# Patient Record
Sex: MALE | Race: BLACK OR AFRICAN AMERICAN | NOT HISPANIC OR LATINO | Employment: FULL TIME | URBAN - METROPOLITAN AREA
[De-identification: names, ages, dates, MRNs, and addresses within clinical notes are randomized per-mention and may not be internally consistent; named-entity substitution may affect disease eponyms.]

---

## 2017-07-10 ENCOUNTER — HOSPITAL ENCOUNTER (EMERGENCY)
Facility: HOSPITAL | Age: 37
Discharge: HOME | End: 2017-07-10
Attending: EMERGENCY MEDICINE
Payer: MEDICAID

## 2017-07-10 VITALS
DIASTOLIC BLOOD PRESSURE: 100 MMHG | HEIGHT: 71 IN | WEIGHT: 195 LBS | TEMPERATURE: 98 F | SYSTOLIC BLOOD PRESSURE: 164 MMHG | RESPIRATION RATE: 16 BRPM | HEART RATE: 80 BPM | BODY MASS INDEX: 27.3 KG/M2 | OXYGEN SATURATION: 99 %

## 2017-07-10 DIAGNOSIS — M54.9 ACUTE BILATERAL BACK PAIN, UNSPECIFIED BACK LOCATION: Primary | ICD-10-CM

## 2017-07-10 PROCEDURE — 99283 EMERGENCY DEPT VISIT LOW MDM: CPT

## 2017-07-10 RX ORDER — KETOROLAC TROMETHAMINE 30 MG/ML
30 INJECTION, SOLUTION INTRAMUSCULAR; INTRAVENOUS
Status: DISCONTINUED | OUTPATIENT
Start: 2017-07-10 | End: 2017-07-10 | Stop reason: HOSPADM

## 2017-07-10 RX ORDER — NAPROXEN 500 MG/1
500 TABLET ORAL 2 TIMES DAILY WITH MEALS
Qty: 14 TABLET | Refills: 0 | Status: SHIPPED | OUTPATIENT
Start: 2017-07-10 | End: 2017-09-25 | Stop reason: SDUPTHER

## 2017-07-10 RX ORDER — ORPHENADRINE CITRATE 30 MG/ML
30 INJECTION INTRAMUSCULAR; INTRAVENOUS
Status: DISCONTINUED | OUTPATIENT
Start: 2017-07-10 | End: 2017-07-10 | Stop reason: HOSPADM

## 2017-07-10 RX ORDER — METHOCARBAMOL 750 MG/1
1500 TABLET, FILM COATED ORAL 3 TIMES DAILY
Qty: 30 TABLET | Refills: 0 | Status: SHIPPED | OUTPATIENT
Start: 2017-07-10 | End: 2017-07-15

## 2017-07-10 NOTE — ED NOTES
"Pt presents to ED secondary to left sided middle back pain. States helped aunt move furniture yesterday. States had pain relief from ibuprofen yesterday but did not take any today. FROM. Pt states back is "stiff." States does remember while picking up steve drawers and feeling a "pulling" sensation. NAD noted.   "

## 2017-07-10 NOTE — ED PROVIDER NOTES
"Encounter Date: 7/10/2017       History     Chief Complaint   Patient presents with    Back Pain     States back tightened up on him yesterday, helped aunt move on Saturday.       José Luis Catherine, a 37 y.o. male that presents to the ED for back pain that started after helping a relative move furniture on Saturday.  He states he felt pain to his lower back after he tried to move a dresser.  Denies any trauma.  The pain is aching in nature with no radiation, worse with certain movement and better with rest.  Denies any history of back pain, injury, numbness or tingling down either leg or loss of bowel or bladder.  Treatments tried including taking an with significant improvement of his back pain yesterday.  No medications taken today.  Patient states "I just want to see if I have pulled muscle".        The history is provided by the patient.     Review of patient's allergies indicates:  No Known Allergies  History reviewed. No pertinent past medical history.  History reviewed. No pertinent surgical history.  History reviewed. No pertinent family history.  Social History   Substance Use Topics    Smoking status: Former Smoker     Packs/day: 1.00     Types: Cigarettes    Smokeless tobacco: Never Used    Alcohol use No     Review of Systems   Constitutional: Negative for fever.   Respiratory: Negative for cough and shortness of breath.    Cardiovascular: Negative for chest pain and leg swelling.   Gastrointestinal: Negative for nausea.   Genitourinary: Negative for dysuria.   Musculoskeletal: Positive for back pain.   Skin: Negative for color change and wound.   Allergic/Immunologic: Negative for immunocompromised state.   Hematological: Negative for adenopathy.   Psychiatric/Behavioral: Negative for agitation.   All other systems reviewed and are negative.      Physical Exam     Initial Vitals [07/10/17 0813]   BP Pulse Resp Temp SpO2   (!) 164/100 80 16 97.6 °F (36.4 °C) 99 %      MAP       121.33         Physical " Exam    Nursing note and vitals reviewed.  Constitutional: He appears well-developed and well-nourished.   HENT:   Head: Normocephalic and atraumatic.   Right Ear: External ear normal.   Left Ear: External ear normal.   Nose: Nose normal.   Mouth/Throat: Oropharynx is clear and moist.   Eyes: EOM are normal.   Neck: Normal range of motion. Neck supple.   Cardiovascular: Normal rate and regular rhythm.   Pulmonary/Chest: Breath sounds normal. No respiratory distress. He has no wheezes. He has no rhonchi. He has no rales.   Abdominal: Soft. He exhibits no distension.   Musculoskeletal: Normal range of motion. He exhibits no edema or tenderness.        Cervical back: Normal.        Thoracic back: Normal.        Lumbar back: Normal.   Noted to be over spinous processes of the spine.  No bony step-off.  Mildly increased pain with flexion, no increased pain with extension and lateral bending spine.  No evidence of ecchymosis or erythema.   Neurological: He is alert and oriented to person, place, and time. No cranial nerve deficit.   Skin: Skin is warm and dry. No rash and no abscess noted. No erythema.   Psychiatric: He has a normal mood and affect. Thought content normal.         ED Course   Procedures  Labs Reviewed - No data to display          Medical Decision Making:   Initial Assessment:   Back pain   Differential Diagnosis:   Sciatica, lower back pain, muscular spasm   ED Management:  Present in NAD, afebrile, and nontoxic.  He has normal AROM of the spine with mildly increased pain with flexion.  Symptoms are most consistent with low back pain due to muscular strain.  Patient was offered Toradol and Norflex refused, stating that he would just go home and take ibuprofen.  He was given information on symptom control and instructed to follow-up with his PCP for further evaluation. Strict return precautions given and patient verbalized understanding.    RX: naprosyn, robaxin (given with driving precautions.    Other:    I discussed test(s) with the performing physician.                   ED Course     Clinical Impression:   The encounter diagnosis was Acute bilateral back pain, unspecified back location.                           Nalini Feliz PA-C  07/10/17 0904       Nalini Feliz PA-C  07/10/17 0906

## 2017-09-25 ENCOUNTER — HOSPITAL ENCOUNTER (EMERGENCY)
Facility: HOSPITAL | Age: 37
Discharge: HOME | End: 2017-09-25
Attending: EMERGENCY MEDICINE
Payer: MEDICAID

## 2017-09-25 VITALS
HEART RATE: 84 BPM | DIASTOLIC BLOOD PRESSURE: 93 MMHG | HEIGHT: 71 IN | BODY MASS INDEX: 34.3 KG/M2 | OXYGEN SATURATION: 100 % | WEIGHT: 245 LBS | SYSTOLIC BLOOD PRESSURE: 148 MMHG | RESPIRATION RATE: 15 BRPM | TEMPERATURE: 98 F

## 2017-09-25 DIAGNOSIS — M54.9 ACUTE RIGHT-SIDED BACK PAIN, UNSPECIFIED BACK LOCATION: Primary | ICD-10-CM

## 2017-09-25 PROCEDURE — 25000003 PHARM REV CODE 250: Performed by: PHYSICIAN ASSISTANT

## 2017-09-25 PROCEDURE — 99283 EMERGENCY DEPT VISIT LOW MDM: CPT

## 2017-09-25 RX ORDER — NAPROXEN 500 MG/1
500 TABLET ORAL 2 TIMES DAILY WITH MEALS
Qty: 14 TABLET | Refills: 0 | Status: SHIPPED | OUTPATIENT
Start: 2017-09-25 | End: 2017-09-25

## 2017-09-25 RX ORDER — IBUPROFEN 600 MG/1
600 TABLET ORAL
Status: COMPLETED | OUTPATIENT
Start: 2017-09-25 | End: 2017-09-25

## 2017-09-25 RX ORDER — NAPROXEN 500 MG/1
500 TABLET ORAL 2 TIMES DAILY WITH MEALS
Qty: 14 TABLET | Refills: 0 | Status: SHIPPED | OUTPATIENT
Start: 2017-09-25 | End: 2017-10-13

## 2017-09-25 RX ADMIN — IBUPROFEN 600 MG: 600 TABLET, FILM COATED ORAL at 10:09

## 2017-09-25 NOTE — ED PROVIDER NOTES
"Encounter Date: 9/25/2017       History     Chief Complaint   Patient presents with    Back Pain     lower back pain since yesterday after lifting weights.  No dysuria.     José Luis Catherine, a 37 y.o. male that presents to the ED for right sided back pain that started this morning.  Patient states that he was lifting weights yesterday when the pain and thinks that he needs to rest and ice the area.  He wants to "just get checked out" because he is afraid that something is wrong with his disk.  He took 2 aleve yesterday with little improvement of his pain.  No treatments tried today.  Denies any loss of bowel or bladder or recent trauma or fall.          The history is provided by the patient.     Review of patient's allergies indicates:  No Known Allergies  History reviewed. No pertinent past medical history.  History reviewed. No pertinent surgical history.  History reviewed. No pertinent family history.  Social History   Substance Use Topics    Smoking status: Current Every Day Smoker     Packs/day: 1.00     Types: Cigarettes    Smokeless tobacco: Never Used    Alcohol use No     Review of Systems   Constitutional: Negative for chills and fever.   Respiratory: Negative for chest tightness and shortness of breath.    Cardiovascular: Negative for chest pain.   Gastrointestinal: Negative for nausea and vomiting.   Genitourinary: Negative for difficulty urinating.   Musculoskeletal: Positive for back pain. Negative for neck pain.   Skin: Negative for color change, pallor and rash.   Allergic/Immunologic: Negative for immunocompromised state.   Neurological: Negative for dizziness, weakness and numbness.   Hematological: Negative for adenopathy.   Psychiatric/Behavioral: Negative for agitation.   All other systems reviewed and are negative.      Physical Exam     Initial Vitals [09/25/17 0906]   BP Pulse Resp Temp SpO2   (!) 148/93 84 15 98.4 °F (36.9 °C) 100 %      MAP       111.33         Physical Exam    Nursing " "note and vitals reviewed.  Constitutional: He appears well-developed and well-nourished.   HENT:   Head: Normocephalic and atraumatic.   Right Ear: External ear normal.   Left Ear: External ear normal.   Nose: Nose normal.   Mouth/Throat: Oropharynx is clear and moist.   Eyes: EOM are normal.   Neck: Normal range of motion. Neck supple.   Cardiovascular: Normal rate and regular rhythm.   Pulmonary/Chest: Breath sounds normal.   Abdominal: Soft. Bowel sounds are normal. He exhibits no distension.   Musculoskeletal: Normal range of motion. He exhibits no edema or tenderness.        Cervical back: Normal.        Thoracic back: Normal.        Lumbar back: Normal.   Noted to be over spinous processes of the spine.  No bony step-off.  Mildly increased pain with flexion, no increased pain with extension and lateral bending spine.  No evidence of ecchymosis or erythema.    Lymphadenopathy:     He has no cervical adenopathy.   Neurological: He is alert and oriented to person, place, and time. He has normal strength. No cranial nerve deficit or sensory deficit.   Skin: Skin is warm and dry. No rash and no abscess noted. No erythema.   Psychiatric: He has a normal mood and affect. Thought content normal.         ED Course   Procedures  Labs Reviewed - No data to display          Medical Decision Making:   Initial Assessment:   Right sided low back pain   Differential Diagnosis:   Muscular strain, sciatica, herniated disk   ED Management:  Present in NAD, afebrile, and nontoxic.  He has normal AROM of the spine with mildly increased pain with flexion.  Symptoms are most consistent with low back pain due to muscular strain.  Patient was offered Toradol and Norflex refused, stating that he would just go home and take ibuprofen.  He was given information on symptom control and instructed to follow-up with his PCP for further evaluation. Of note, patient states that he "does not feel like this is an emergency" and was seen here for " the same complaint 2 months prior and specifically requested at work note that time.  He did not follow up with PCP for further evaluation. He was given education on proper utilization of ED.  Strict return precautions given and patient verbalized understanding.    RX: naprosyn                Attending Attestation:     Physician Attestation Statement for NP/PA:   I discussed this assessment and plan of this patient with the NP/PA, but I did not personally examine the patient. The face to face encounter was performed by the NP/PA.                  ED Course      Clinical Impression:   The encounter diagnosis was Acute right-sided back pain, unspecified back location.    Disposition:   Disposition: Discharged  Condition: Stable                        Nalini Feliz PA-C  09/25/17 1038       Zora Isbael MD  09/25/17 2637

## 2017-10-13 ENCOUNTER — HOSPITAL ENCOUNTER (EMERGENCY)
Facility: HOSPITAL | Age: 37
Discharge: HOME | End: 2017-10-13
Attending: EMERGENCY MEDICINE
Payer: MEDICAID

## 2017-10-13 VITALS
DIASTOLIC BLOOD PRESSURE: 88 MMHG | HEART RATE: 78 BPM | BODY MASS INDEX: 28.84 KG/M2 | HEIGHT: 71 IN | WEIGHT: 206 LBS | SYSTOLIC BLOOD PRESSURE: 135 MMHG | OXYGEN SATURATION: 100 % | RESPIRATION RATE: 18 BRPM | TEMPERATURE: 97 F

## 2017-10-13 DIAGNOSIS — H10.31 ACUTE CONJUNCTIVITIS OF RIGHT EYE, UNSPECIFIED ACUTE CONJUNCTIVITIS TYPE: Primary | ICD-10-CM

## 2017-10-13 PROCEDURE — 99283 EMERGENCY DEPT VISIT LOW MDM: CPT

## 2017-10-13 PROCEDURE — 25000003 PHARM REV CODE 250: Performed by: EMERGENCY MEDICINE

## 2017-10-13 RX ORDER — PROPARACAINE HYDROCHLORIDE 5 MG/ML
1 SOLUTION/ DROPS OPHTHALMIC
Status: COMPLETED | OUTPATIENT
Start: 2017-10-13 | End: 2017-10-13

## 2017-10-13 RX ORDER — ERYTHROMYCIN 5 MG/G
OINTMENT OPHTHALMIC
Qty: 1 TUBE | Refills: 0 | Status: SHIPPED | OUTPATIENT
Start: 2017-10-13 | End: 2018-03-05

## 2017-10-13 RX ADMIN — PROPARACAINE HYDROCHLORIDE 1 DROP: 5 SOLUTION/ DROPS OPHTHALMIC at 11:10

## 2017-10-13 RX ADMIN — FLUORESCEIN SODIUM 1 STRIP: 1 STRIP OPHTHALMIC at 11:10

## 2017-10-13 NOTE — DISCHARGE INSTRUCTIONS
1) PLEASE FOLLOW UP WITH YOUR PRIMARY CARE PROVIDER IN 3 DAYS IF YOU ARE NOT SIGNIFICANTLY IMPROVED  2) PLEASE TAKE YOUR MEDICATIONS AS PRESCRIBED- ERYTHROMYCIN OINTMENT  3) RETURN TO THE ED FOR WORSENING SYMPTOMS

## 2017-10-13 NOTE — ED PROVIDER NOTES
Encounter Date: 10/13/2017       History     Chief Complaint   Patient presents with    Eye Drainage     pt c/o green drainage from left eye x 1 day     37-year-old male with thick green discharge from his left eye that started yesterday.  He works in construction, is unsure whether or not something got caught in his eye.  He denies fevers chills nausea or vomiting.  He's having thick discharge, denies any sick contacts.  He is having blurred vision from all of the discharge.  Denies fevers or chills.          Review of patient's allergies indicates:  No Known Allergies  History reviewed. No pertinent past medical history.  History reviewed. No pertinent surgical history.  History reviewed. No pertinent family history.  Social History   Substance Use Topics    Smoking status: Current Every Day Smoker     Packs/day: 1.00     Types: Cigarettes    Smokeless tobacco: Never Used    Alcohol use No     Review of Systems   Eyes: Positive for discharge.   All other systems reviewed and are negative.      Physical Exam     Initial Vitals [10/13/17 0945]   BP Pulse Resp Temp SpO2   (!) 139/93 80 18 98.5 °F (36.9 °C) 99 %      MAP       108.33         Physical Exam    Nursing note and vitals reviewed.  Constitutional: He appears well-developed and well-nourished.   HENT:   Head: Normocephalic and atraumatic.   Right Ear: External ear normal.   Left Ear: External ear normal.   Eyes: EOM are normal. Pupils are equal, round, and reactive to light.   He has left-sided conjunctival injection, and thick greenish yellow discharge.  Slit-lamp exam without any fluorescein uptake.  He has no evidence of corneal abrasion or foreign body.    His visual acuity is 20/25 in both eyes.   Neck: Normal range of motion.   Cardiovascular: Normal rate.   Pulmonary/Chest: Breath sounds normal.   Abdominal: Soft. Bowel sounds are normal.   Musculoskeletal: Normal range of motion.   Neurological: He is alert and oriented to person, place, and  time.   Skin: Skin is warm and dry.   Psychiatric: He has a normal mood and affect. His behavior is normal. Thought content normal.         ED Course   Procedures  Labs Reviewed - No data to display                            ED Course      Clinical Impression:   The encounter diagnosis was Acute conjunctivitis of right eye, unspecified acute conjunctivitis type.                           Allison Goldman MD  10/13/17 9362

## 2018-03-05 ENCOUNTER — HOSPITAL ENCOUNTER (EMERGENCY)
Facility: HOSPITAL | Age: 38
Discharge: HOME | End: 2018-03-05
Attending: EMERGENCY MEDICINE
Payer: MEDICAID

## 2018-03-05 VITALS
HEART RATE: 104 BPM | HEIGHT: 71 IN | SYSTOLIC BLOOD PRESSURE: 134 MMHG | DIASTOLIC BLOOD PRESSURE: 86 MMHG | BODY MASS INDEX: 31.5 KG/M2 | TEMPERATURE: 99 F | RESPIRATION RATE: 16 BRPM | WEIGHT: 225 LBS | OXYGEN SATURATION: 96 %

## 2018-03-05 DIAGNOSIS — R68.89 FLU-LIKE SYMPTOMS: Primary | ICD-10-CM

## 2018-03-05 PROCEDURE — 25000003 PHARM REV CODE 250: Performed by: EMERGENCY MEDICINE

## 2018-03-05 PROCEDURE — 99283 EMERGENCY DEPT VISIT LOW MDM: CPT

## 2018-03-05 RX ORDER — IBUPROFEN 600 MG/1
600 TABLET ORAL
Status: COMPLETED | OUTPATIENT
Start: 2018-03-05 | End: 2018-03-05

## 2018-03-05 RX ORDER — IBUPROFEN 600 MG/1
600 TABLET ORAL EVERY 6 HOURS PRN
Qty: 20 TABLET | Refills: 0 | Status: SHIPPED | OUTPATIENT
Start: 2018-03-05 | End: 2018-04-02 | Stop reason: ALTCHOICE

## 2018-03-05 RX ORDER — FLUTICASONE PROPIONATE 50 MCG
1 SPRAY, SUSPENSION (ML) NASAL 2 TIMES DAILY PRN
Qty: 15 G | Refills: 0 | Status: SHIPPED | OUTPATIENT
Start: 2018-03-05 | End: 2018-06-25

## 2018-03-05 RX ORDER — CETIRIZINE HYDROCHLORIDE 10 MG/1
10 TABLET ORAL DAILY
Qty: 30 TABLET | Refills: 0 | Status: SHIPPED | OUTPATIENT
Start: 2018-03-05 | End: 2018-06-25

## 2018-03-05 RX ORDER — BENZONATATE 100 MG/1
100 CAPSULE ORAL 3 TIMES DAILY PRN
Qty: 20 CAPSULE | Refills: 0 | Status: SHIPPED | OUTPATIENT
Start: 2018-03-05 | End: 2018-03-15

## 2018-03-05 RX ADMIN — IBUPROFEN 600 MG: 600 TABLET, FILM COATED ORAL at 08:03

## 2018-03-05 NOTE — ED NOTES
Pt c/o sinus congestion, headache, and cough with clear sputum since Friday. Also c/o fever today. Has been taking ibuprofen and robitussin at home. Breath sounds clear throughout chest. Skin is hot, dry.

## 2018-03-05 NOTE — ED PROVIDER NOTES
Encounter Date: 3/5/2018       History     Chief Complaint   Patient presents with    Cough     cough, congestion, headache since yesterday.  Family member has the flu      José Luis Catherine, a 37 y.o. male that presents to the ED for fever, congestion and nonproductive cough since Friday.  Patient states that his daughter was recently diagnosed with the flu.  Treatments tried included administration of ibuprofen and Robitussin with little improvement of his symptoms.  Patient does attest to smoking history but states that his cough has been dry in nature.  He denies any sore throat, nausea, vomiting, chest pain, shortness of breath.        The history is provided by the patient.     Review of patient's allergies indicates:  No Known Allergies  History reviewed. No pertinent past medical history.  History reviewed. No pertinent surgical history.  History reviewed. No pertinent family history.  Social History   Substance Use Topics    Smoking status: Current Every Day Smoker     Packs/day: 1.00     Types: Cigarettes    Smokeless tobacco: Never Used    Alcohol use No     Review of Systems   Constitutional: Positive for fever.   Respiratory: Positive for cough. Negative for shortness of breath.    Cardiovascular: Negative for chest pain.   Gastrointestinal: Negative for abdominal pain, nausea and vomiting.   Skin: Negative for color change.   Allergic/Immunologic: Negative for immunocompromised state.   Neurological: Negative for dizziness.   Hematological: Negative for adenopathy.   Psychiatric/Behavioral: Negative for agitation.   All other systems reviewed and are negative.      Physical Exam     Initial Vitals [03/05/18 0821]   BP Pulse Resp Temp SpO2   134/86 104 16 (!) 101.6 °F (38.7 °C) 96 %      MAP       102         Physical Exam    Nursing note and vitals reviewed.  Constitutional: He appears well-developed and well-nourished.   HENT:   Head: Normocephalic and atraumatic.   Right Ear: Hearing, tympanic  membrane, external ear and ear canal normal.   Left Ear: Hearing, tympanic membrane, external ear and ear canal normal.   Nose: Mucosal edema present.   Mouth/Throat: Oropharynx is clear and moist and mucous membranes are normal.   Eyes: Conjunctivae and EOM are normal.   Neck: Normal range of motion. Neck supple.   Cardiovascular: Normal rate and regular rhythm.   Pulmonary/Chest: Breath sounds normal. No stridor. No respiratory distress. He has no wheezes. He has no rhonchi. He has no rales.   Musculoskeletal: Normal range of motion. He exhibits no edema or tenderness.   Lymphadenopathy:     He has no cervical adenopathy.   Neurological: He is alert and oriented to person, place, and time. No cranial nerve deficit.   Skin: Skin is warm and dry. Capillary refill takes less than 2 seconds. No rash and no abscess noted. No erythema.   Psychiatric: He has a normal mood and affect. Thought content normal.         ED Course   Procedures  Labs Reviewed - No data to display          Medical Decision Making:   Initial Assessment:   Flu like symptoms after exposure  Differential Diagnosis:   Influenza, viral URI, rhinitis   ED Management:  She presents to ED and NAD, afebrile, nontoxic appearing.  Ibuprofen was administered in the ED with improvement of fever.  Symptoms seem most consistent with influenza however patient is outside the window of treatment with Tamiflu.  He will be given a prescription for Flonase for congestion, Tessalon Perles for cough, ibuprofen for his fever and Zyrtec for congestion as well.  Patient was instructed to follow-up with his PCP for further evaluation and increased fluids.  Instructed to return with any new or worsening symptoms.              Attending Attestation:     Physician Attestation Statement for NP/PA:   I discussed this assessment and plan of this patient with the NP/PA, but I did not personally examine the patient. The face to face encounter was performed by the  NP/PA.                     Clinical Impression:   The encounter diagnosis was Flu-like symptoms.                           Nalini Feliz PA-C  03/05/18 0948       Nasir Montemayor MD  03/05/18 1041

## 2018-04-02 ENCOUNTER — HOSPITAL ENCOUNTER (EMERGENCY)
Facility: HOSPITAL | Age: 38
Discharge: HOME | End: 2018-04-02
Attending: EMERGENCY MEDICINE
Payer: MEDICAID

## 2018-04-02 VITALS
DIASTOLIC BLOOD PRESSURE: 74 MMHG | SYSTOLIC BLOOD PRESSURE: 120 MMHG | HEART RATE: 92 BPM | RESPIRATION RATE: 18 BRPM | WEIGHT: 225 LBS | OXYGEN SATURATION: 98 % | BODY MASS INDEX: 31.5 KG/M2 | HEIGHT: 71 IN | TEMPERATURE: 97 F

## 2018-04-02 DIAGNOSIS — R52 PAIN: ICD-10-CM

## 2018-04-02 DIAGNOSIS — S43.401A SPRAIN OF RIGHT SHOULDER, UNSPECIFIED SHOULDER SPRAIN TYPE, INITIAL ENCOUNTER: Primary | ICD-10-CM

## 2018-04-02 PROCEDURE — 99283 EMERGENCY DEPT VISIT LOW MDM: CPT

## 2018-04-02 PROCEDURE — 25000003 PHARM REV CODE 250: Performed by: PHYSICIAN ASSISTANT

## 2018-04-02 RX ORDER — KETOROLAC TROMETHAMINE 10 MG/1
10 TABLET, FILM COATED ORAL
Status: COMPLETED | OUTPATIENT
Start: 2018-04-02 | End: 2018-04-02

## 2018-04-02 RX ORDER — IBUPROFEN 600 MG/1
600 TABLET ORAL EVERY 6 HOURS PRN
Qty: 20 TABLET | Refills: 0 | Status: SHIPPED | OUTPATIENT
Start: 2018-04-02 | End: 2018-06-25

## 2018-04-02 RX ORDER — KETOROLAC TROMETHAMINE 30 MG/ML
30 INJECTION, SOLUTION INTRAMUSCULAR; INTRAVENOUS
Status: DISCONTINUED | OUTPATIENT
Start: 2018-04-02 | End: 2018-04-02

## 2018-04-02 RX ADMIN — KETOROLAC TROMETHAMINE 10 MG: 10 TABLET, FILM COATED ORAL at 01:04

## 2018-04-02 NOTE — ED PROVIDER NOTES
Encounter Date: 4/2/2018       History     Chief Complaint   Patient presents with    Shoulder Pain     right shoulder pain that increased today after falling yesterday in yard and landed on right shoulder.  Pain increases with lifting and ROM.       José Luis Catherine 37 y.o. male that is right hand dominant presented to the ED with c/o right shoulder pain status post fall.  He states that he was carrying his child yesterday when he had a trip and fall onto the affected right upper shoulder.  He states only mild discomfort at the time of the fall however states pain has gradually worsened since this injury.  He states that he is unable to complete full range of motion secondary to pain.  He denies any numbness, tingling or pain radiation.  She has not tried any medications for symptoms.  He denies any previous injury.      The history is provided by the patient.     Review of patient's allergies indicates:  No Known Allergies  History reviewed. No pertinent past medical history.  History reviewed. No pertinent surgical history.  History reviewed. No pertinent family history.  Social History   Substance Use Topics    Smoking status: Former Smoker     Packs/day: 1.00     Types: Cigarettes     Quit date: 3/2/2018    Smokeless tobacco: Never Used    Alcohol use No     Review of Systems   Constitutional: Negative for chills and fever.   Musculoskeletal: Positive for arthralgias (right shoulder pain). Negative for back pain and joint swelling.   Skin: Negative for color change, rash and wound.   Neurological: Negative for weakness and numbness.   Hematological: Does not bruise/bleed easily.       Physical Exam     Initial Vitals [04/02/18 1246]   BP Pulse Resp Temp SpO2   128/76 (!) 113 -- 98.2 °F (36.8 °C) 96 %      MAP       93.33         Physical Exam    Nursing note and vitals reviewed.  Constitutional: Vital signs are normal. He appears well-developed and well-nourished. He is cooperative.  Non-toxic appearance. He  does not appear ill.   HENT:   Head: Normocephalic and atraumatic.   Eyes: Conjunctivae and lids are normal.   Neck: Trachea normal and normal range of motion. Neck supple. No stridor present. No tracheal deviation present.   Cardiovascular: Normal rate and regular rhythm.   Abdominal: Soft. Normal appearance.   Neurological: He is alert and oriented to person, place, and time. GCS eye subscore is 4. GCS verbal subscore is 5. GCS motor subscore is 6.   Skin: Skin is warm, dry and intact. No rash noted.   Psychiatric: He has a normal mood and affect. His speech is normal and behavior is normal. Thought content normal.         ED Course   Procedures  Labs Reviewed - No data to display      Imaging Results          X-Ray Ac Joints (Final result)  Result time 04/02/18 13:52:25    Final result by Spencer Moya MD (04/02/18 13:52:25)                 Impression:      No acute displaced fracture-dislocation identified.      Electronically signed by: Spencer Moya MD  Date:    04/02/2018  Time:    13:52             Narrative:    EXAMINATION:  XR ACROMIOCLAVICULAR JOINTS BILATERAL W WO WEIGHTS    CLINICAL HISTORY:  Pain, unspecified    TECHNIQUE:  AP views of the bilateral AC joints both with and without weights.    COMPARISON:  Right shoulder series same day and chest radiograph 11/08/2016.    FINDINGS:  Overall alignment is similar to prior and within normal limits, without significant change in with or without weights.  Bones are well mineralized.  No acute displaced fracture, dislocation or destructive osseous process or significant asymmetry at either shoulder or clavicle.  Minimal degenerative change at the bilateral AC joints.  No subcutaneous emphysema or radiodense retained foreign body.  Bilateral lung apices clear.                               X-Ray Shoulder Trauma Right (Final result)  Result time 04/02/18 13:50:28    Final result by Spencer Moya MD (04/02/18 13:50:28)                 Impression:      No  acute displaced fracture-dislocation identified.      Electronically signed by: Spencer Moya MD  Date:    04/02/2018  Time:    13:50             Narrative:    EXAMINATION:  XR SHOULDER TRAUMA 3 VIEW RIGHT    CLINICAL HISTORY:  Pain, unspecified    TECHNIQUE:  Three or four views of the right shoulder were performed.    COMPARISON:  Chest radiograph 11/08/2016.    FINDINGS:  Bones are well mineralized.  Overall alignment is within normal limits.  No acute displaced fracture, dislocation or destructive osseous process.  Mild degenerative change at the right AC joint similar to prior.  No right-sided apical pneumothorax.  No subcutaneous emphysema or radiodense retained foreign body.                              José Luis Catherine 37 y.o. male that is right hand dominant presented to the ED with c/o right shoulder pain status post fall.  He states that he was carrying his child yesterday when he had a trip and fall onto the affected right upper shoulder.  He states only mild discomfort at the time of the fall however states pain has gradually worsened since this injury.  He states that he is unable to complete full range of motion secondary to pain.  He denies any numbness, tingling or pain radiation.  She has not tried any medications for symptoms.  He denies any previous injury. ROS positive for right shoulder pain.  Physical exam reveals patient well appearing in some distress secondary to pain.  Patient exhibits limited range of motion of the right shoulder with noted decreased range of motion with over head extension.  Pain on supination and pronation.  Pain over the before meals joint and anterior shoulder with no obvious bony deformity, crepitus or edema.  Neurovascularly intact.    DDX: fracture, sprain, dislocation    ED management: ice and toradol in the ED. X-ray with no acute deformity. We will place sling for protection and comfort. Instructed patient on RICE, NSAID's for pain and swelling, and to follow up  with Ortho or PCP for evaluation of probable shoulder sprain. He was instructed on light duty but states he would like to return to work tomorrow; he was cautioned on possible worsening of pain.     Impression/Plan: The primary encounter diagnosis was Sprain of right shoulder, unspecified shoulder sprain type, initial encounter. A diagnosis of Pain was also pertinent to this visit. Discharged with motrin. Patient will follow up with Primary or orthopedic.  Patient cautioned on when to return to ED.  Pt. Understands and agrees with current treatment plan                              Clinical Impression:   The primary encounter diagnosis was Sprain of right shoulder, unspecified shoulder sprain type, initial encounter. A diagnosis of Pain was also pertinent to this visit.                           ESTEPHANIA Ortega  04/02/18 9413

## 2018-06-25 ENCOUNTER — HOSPITAL ENCOUNTER (EMERGENCY)
Facility: HOSPITAL | Age: 38
Discharge: HOME | End: 2018-06-25
Attending: EMERGENCY MEDICINE
Payer: MEDICAID

## 2018-06-25 VITALS
OXYGEN SATURATION: 100 % | WEIGHT: 225 LBS | RESPIRATION RATE: 18 BRPM | BODY MASS INDEX: 31.5 KG/M2 | DIASTOLIC BLOOD PRESSURE: 80 MMHG | TEMPERATURE: 98 F | SYSTOLIC BLOOD PRESSURE: 138 MMHG | HEART RATE: 85 BPM | HEIGHT: 71 IN

## 2018-06-25 DIAGNOSIS — M54.9 ACUTE RIGHT-SIDED BACK PAIN, UNSPECIFIED BACK LOCATION: Primary | ICD-10-CM

## 2018-06-25 PROCEDURE — 99283 EMERGENCY DEPT VISIT LOW MDM: CPT | Mod: 25

## 2018-06-25 RX ORDER — ORPHENADRINE CITRATE 100 MG/1
100 TABLET, EXTENDED RELEASE ORAL 2 TIMES DAILY
Qty: 15 TABLET | Refills: 0 | Status: SHIPPED | OUTPATIENT
Start: 2018-06-25 | End: 2018-07-05

## 2018-06-25 RX ORDER — IBUPROFEN 600 MG/1
600 TABLET ORAL EVERY 6 HOURS PRN
Qty: 15 TABLET | Refills: 0 | Status: SHIPPED | OUTPATIENT
Start: 2018-06-25 | End: 2019-04-30 | Stop reason: SDUPTHER

## 2018-06-25 NOTE — ED PROVIDER NOTES
Encounter Date: 6/25/2018    SCRIBE #1 NOTE: I, Anuja Bertrand, am scribing for, and in the presence of,  Dr. Montemayor. I have scribed the entire note.       History     Chief Complaint   Patient presents with    Back Pain     pt reports lower back pain started Friday while bending down to help daughter clean up.      Time seen by provider: 11:47 AM    This is a 37 y.o. male with no significant PMHx who presents to the ED with a cc of intermittent right lower back pain x 3 days. Pt states he felt a tweak in his back 3 days ago which resolved with ibuprofin, but he felt the same tweak today while bending over at work. He denies radiation. He denies abdominal pain, leg pain, dysuria, or flank pain. Pt reports no prior history of similar symptoms. Pt reports no other medical complaints or injuries at this time. Pt does a lot of heavy lifting at work.        The history is provided by the patient.     Review of patient's allergies indicates:  No Known Allergies  History reviewed. No pertinent past medical history.  History reviewed. No pertinent surgical history.  History reviewed. No pertinent family history.  Social History   Substance Use Topics    Smoking status: Current Every Day Smoker     Packs/day: 0.50     Types: Cigarettes     Last attempt to quit: 3/2/2018    Smokeless tobacco: Never Used    Alcohol use No     Review of Systems   Constitutional: Negative for chills and fever.   HENT: Negative for congestion, ear pain, rhinorrhea and sore throat.    Respiratory: Negative for cough, shortness of breath and wheezing.    Cardiovascular: Negative for chest pain and palpitations.   Gastrointestinal: Negative for abdominal pain, diarrhea, nausea and vomiting.   Genitourinary: Negative for difficulty urinating, dysuria, flank pain and hematuria.   Musculoskeletal: Positive for back pain. Negative for myalgias and neck pain.        Negative for leg pain.   Skin: Negative for rash.   Neurological: Negative for  dizziness, weakness, light-headedness, numbness and headaches.   Psychiatric/Behavioral: Negative for confusion.   All other systems reviewed and are negative.      Physical Exam     Initial Vitals [06/25/18 1113]   BP Pulse Resp Temp SpO2   (!) 142/88 85 16 98.2 °F (36.8 °C) 99 %      MAP       --         Physical Exam    Nursing note and vitals reviewed.  Constitutional: He appears well-developed and well-nourished. No distress.   HENT:   Head: Normocephalic and atraumatic.   Mouth/Throat: Oropharynx is clear and moist.   Eyes: Conjunctivae and EOM are normal. Pupils are equal, round, and reactive to light.   Neck: Normal range of motion. Neck supple. No JVD present.   Cardiovascular: Normal rate, regular rhythm and normal heart sounds. Exam reveals no gallop and no friction rub.    No murmur heard.  Pulmonary/Chest: Breath sounds normal. No respiratory distress. He has no wheezes. He has no rhonchi. He has no rales.   Abdominal: Soft. Bowel sounds are normal. He exhibits no distension. There is no tenderness.   Musculoskeletal: Normal range of motion.   Tenderness of the right upper lumbar perispinous muscle.   Neurological: He is alert and oriented to person, place, and time.   Skin: Skin is warm and dry.   Psychiatric: He has a normal mood and affect. His behavior is normal.         ED Course   Procedures  Labs Reviewed - No data to display       Imaging Results          X-Ray Lumbar Spine Ap And Lateral (Final result)  Result time 06/25/18 13:09:47    Final result by Shaun Anderson MD (06/25/18 13:09:47)                 Impression:      No acute findings.      Electronically signed by: Shaun Anderson MD  Date:    06/25/2018  Time:    13:09             Narrative:    EXAMINATION:  XR LUMBAR SPINE AP AND LATERAL    CLINICAL HISTORY:  Low back pain, <6wks, no red flags, no prior management;    TECHNIQUE:  AP, lateral and spot images were performed of the lumbar spine.    COMPARISON:  None    FINDINGS:  The  alignment is within normal limits.  No fracture.  No marrow replacement process.  Vertebral body heights and intervertebral disc spaces are well maintained.                                 Medical Decision Making:   Clinical Tests:   Radiological Study: Ordered and Reviewed  ED Management:  37-year-old male with lower back pain. Patient has no neurologic deficits and x-rays unremarkable. He will be placed on ibuprofen and Norflex.  I have suggested he follow up with his primary physician if not significantly improved in 1 week.                      Clinical Impression:     1. Acute right-sided back pain, unspecified back location               I, Dr. Nasir Montemayor, personally performed the services described in this documentation. All medical record entries made by the scribe were at my direction and in my presence. I have reviewed the chart and agree that the record reflects my personal performance and is accurate and complete. Nasir Montemayor MD.  12:36 PM 06/25/2018                     Nasir Montemayor MD  06/25/18 7898

## 2018-06-25 NOTE — ED NOTES
APPEARANCE: Alert, oriented and in no acute distress.  CARDIAC: Normal rate and rhythm   PERIPHERAL VASCULAR: peripheral pulses present. Normal cap refill. No edema. Warm to touch.    RESPIRATORY:Normal rate and effort, breath sounds clear bilaterally throughout chest. Respirations are equal and unlabored no obvious signs of distress.  SKIN: Skin is warm and dry, normal skin turgor, mucous membranes moist.

## 2018-07-19 ENCOUNTER — HOSPITAL ENCOUNTER (EMERGENCY)
Facility: HOSPITAL | Age: 38
Discharge: HOME | End: 2018-07-19
Attending: EMERGENCY MEDICINE
Payer: MEDICAID

## 2018-07-19 VITALS
TEMPERATURE: 98 F | BODY MASS INDEX: 32.9 KG/M2 | RESPIRATION RATE: 20 BRPM | WEIGHT: 235 LBS | DIASTOLIC BLOOD PRESSURE: 81 MMHG | HEIGHT: 71 IN | SYSTOLIC BLOOD PRESSURE: 136 MMHG | HEART RATE: 92 BPM | OXYGEN SATURATION: 98 %

## 2018-07-19 DIAGNOSIS — M54.50 ACUTE RIGHT-SIDED LOW BACK PAIN WITHOUT SCIATICA: Primary | ICD-10-CM

## 2018-07-19 PROCEDURE — 99283 EMERGENCY DEPT VISIT LOW MDM: CPT

## 2018-07-19 PROCEDURE — 25000003 PHARM REV CODE 250: Performed by: PHYSICIAN ASSISTANT

## 2018-07-19 RX ORDER — KETOROLAC TROMETHAMINE 10 MG/1
10 TABLET, FILM COATED ORAL
Status: COMPLETED | OUTPATIENT
Start: 2018-07-19 | End: 2018-07-19

## 2018-07-19 RX ORDER — METHOCARBAMOL 750 MG/1
1500 TABLET, FILM COATED ORAL 3 TIMES DAILY
Qty: 30 TABLET | Refills: 0 | Status: SHIPPED | OUTPATIENT
Start: 2018-07-19 | End: 2018-07-24

## 2018-07-19 RX ORDER — METHOCARBAMOL 750 MG/1
1500 TABLET, FILM COATED ORAL
Status: COMPLETED | OUTPATIENT
Start: 2018-07-19 | End: 2018-07-19

## 2018-07-19 RX ORDER — KETOROLAC TROMETHAMINE 10 MG/1
10 TABLET, FILM COATED ORAL EVERY 6 HOURS
Qty: 12 TABLET | Refills: 0 | Status: SHIPPED | OUTPATIENT
Start: 2018-07-19 | End: 2018-07-22

## 2018-07-19 RX ADMIN — METHOCARBAMOL 1500 MG: 750 TABLET ORAL at 09:07

## 2018-07-19 RX ADMIN — KETOROLAC TROMETHAMINE 10 MG: 10 TABLET, FILM COATED ORAL at 09:07

## 2018-07-19 NOTE — ED NOTES
Pt. Reports pain across lower back since yesterday. Onset of pain after bending over to  a hose. The patient ambulates with normal gait. No associated symptoms.

## 2018-07-19 NOTE — ED PROVIDER NOTES
Encounter Date: 7/19/2018       History     Chief Complaint   Patient presents with    Back Pain     lower back pain since yesterday after bending over to  a hose     Hermilo Catherine  38 y.o. male who is a daily smoker with no significant past medical history presented to the ED with c/o right lower back pain. He states that he was picking up a and industrial hose when he felt a pull in his right lower back.  He states that he had some initial soreness that has gradually worsened since onset.  He states that this pain is exacerbated with certain movements and palpation of the right lower back.  He denies any pain radiation, numbness, tingling, urinary retention, bowel or bladde incontinence, GI or  symptoms. He states he has not tried any medications for symptoms. He did report some improvement since onset as he took 2 days off from work.  He has been to this ED in the past with similar complaint complaints of low back pain however denies any significant trauma and has not followed up with his PCP regarding it.       The history is provided by the patient.     Review of patient's allergies indicates:  No Known Allergies  History reviewed. No pertinent past medical history.  No past surgical history on file.  No family history on file.  Social History   Substance Use Topics    Smoking status: Current Every Day Smoker     Packs/day: 0.50     Types: Cigarettes     Last attempt to quit: 3/2/2018    Smokeless tobacco: Never Used    Alcohol use No     Review of Systems   Constitutional: Negative for chills and fever.   Respiratory: Negative for shortness of breath.    Cardiovascular: Negative for chest pain.   Gastrointestinal: Negative for abdominal pain, nausea and vomiting.   Genitourinary: Negative for dysuria and flank pain.   Musculoskeletal: Positive for back pain. Negative for arthralgias, gait problem, joint swelling and myalgias.   Skin: Negative for rash.   Neurological: Negative for weakness and  numbness.   Hematological: Does not bruise/bleed easily.       Physical Exam     Initial Vitals [07/19/18 0853]   BP Pulse Resp Temp SpO2   136/81 92 20 98.2 °F (36.8 °C) 98 %      MAP       --         Physical Exam    Nursing note and vitals reviewed.  Constitutional: Vital signs are normal. He appears well-developed and well-nourished. He is cooperative.  Non-toxic appearance. He does not appear ill. No distress.   HENT:   Head: Normocephalic and atraumatic.   Eyes: Conjunctivae and lids are normal.   Neck: Trachea normal and normal range of motion. Neck supple. No stridor present.   Cardiovascular: Normal rate and regular rhythm.   Pulmonary/Chest: No respiratory distress.   Abdominal: Soft. Normal appearance. There is no tenderness.   Musculoskeletal: Normal range of motion.        Lumbar back: He exhibits tenderness and spasm. He exhibits normal range of motion, no bony tenderness and no pain.        Back:    Neurological: He is alert and oriented to person, place, and time. GCS eye subscore is 4. GCS verbal subscore is 5. GCS motor subscore is 6.   Skin: Skin is warm, dry and intact. No rash noted.   Psychiatric: He has a normal mood and affect. His speech is normal and behavior is normal. Thought content normal.         ED Course   Procedures  Labs Reviewed - No data to display     Hermilo Catherine  38 y.o. male who is a daily smoker with no significant past medical history presented to the ED with c/o right lower back pain. He states that he was picking up a and industrial hose when he felt a pull in his right lower back.  He states that he had some initial soreness that has gradually worsened since onset.  He states that this pain is exacerbated with certain movements and palpation of the right lower back.  He denies any pain radiation, numbness, tingling, urinary retention, bowel or bladde incontinence, GI or  symptoms. He states he has not tried any medications for symptoms. He did report some improvement  since onset as he took 2 days off from work.  He has been to this ED in the past with similar complaint complaints of low back pain however denies any significant trauma and has not followed up with his PCP regarding it.  ROS positive for back pain.  Physical exam reveals patient well appearing in no distress exhibiting smooth steady gait to room. FROM of neck and all extremities with strength 5/5 bilaterally. TTP of the right lower paraspinal muscles with spasm noted; no midline tenderness, bony deformity or step-off. Lungs clear, heart regular rate and rhythm. Abdomen is soft and nontender with no rebound or rigidity.     DDX: back spasm, fracture, dislocation    ED management: no imaging at this time as low suspicion for acute bony deformity. We will treat with symptomatic medications for back spasm. Encouraged rest, hot soaks and massage.     Impression/Plan: The encounter diagnosis was Acute right-sided low back pain without sciatica.  Discharged with f Toradol and Robaxin.  Patient will follow up with Primary.  Patient cautioned on when to return to ED.  Pt. Understands and agrees with current treatment plan    Imaging Results    None                               Clinical Impression:   The encounter diagnosis was Acute right-sided low back pain without sciatica.                             ESTEPHANIA Ortega  07/19/18 0909

## 2018-08-22 ENCOUNTER — HOSPITAL ENCOUNTER (EMERGENCY)
Facility: HOSPITAL | Age: 38
Discharge: HOME | End: 2018-08-22
Attending: EMERGENCY MEDICINE
Payer: MEDICAID

## 2018-08-22 VITALS
HEIGHT: 71 IN | BODY MASS INDEX: 31.5 KG/M2 | SYSTOLIC BLOOD PRESSURE: 148 MMHG | TEMPERATURE: 98 F | OXYGEN SATURATION: 100 % | WEIGHT: 225 LBS | RESPIRATION RATE: 20 BRPM | DIASTOLIC BLOOD PRESSURE: 86 MMHG | HEART RATE: 94 BPM

## 2018-08-22 DIAGNOSIS — M25.512 ACUTE PAIN OF LEFT SHOULDER: Primary | ICD-10-CM

## 2018-08-22 PROCEDURE — 99282 EMERGENCY DEPT VISIT SF MDM: CPT

## 2018-08-23 NOTE — ED PROVIDER NOTES
Encounter Date: 2018    SCRIBE #1 NOTE: I, Shannan Palomo, am scribing for, and in the presence of,  Dr. Booth. I have scribed the entire note.       History     Chief Complaint   Patient presents with    Shoulder Injury     38 year old male presents to ed cc of left shoulder injury. patient states he was lifting a cvb pipe and states began to feel pain in shoulder.      Hermilo Catherine is a 38 y.o. male who  has no past medical history on file.    The patient presents to the ED due to pain to his left shoulder when carrying a pipe while at work. He describes the pain as sore but is able to demonstrate full range of motion of the left upper extremity. The patient denies any redness, swelling, numbness, or weakness in the extremity. He denies any use of medication for pain and denies any direct trauma to the left shoulder.         The history is provided by the patient.     Review of patient's allergies indicates:  No Known Allergies  History reviewed. No pertinent past medical history.  History reviewed. No pertinent surgical history.  History reviewed. No pertinent family history.  Social History     Tobacco Use    Smoking status: Current Every Day Smoker     Packs/day: 0.50     Types: Cigarettes     Last attempt to quit: 3/2/2018     Years since quittin.4    Smokeless tobacco: Never Used   Substance Use Topics    Alcohol use: No    Drug use: No     Review of Systems   Musculoskeletal:        Left shoulder pain    All other systems reviewed and are negative.      Physical Exam     Initial Vitals [18 1859]   BP Pulse Resp Temp SpO2   (!) 148/86 94 20 98.3 °F (36.8 °C) 100 %      MAP       --         Physical Exam    Nursing note and vitals reviewed.  Constitutional: He appears well-developed and well-nourished. He is not diaphoretic. No distress.   HENT:   Head: Normocephalic and atraumatic.   Mouth/Throat: Oropharynx is clear and moist.   Eyes: Conjunctivae and EOM are normal.   Neck: Normal  range of motion. Neck supple.   Cardiovascular: Normal rate, regular rhythm and normal heart sounds. Exam reveals no gallop and no friction rub.    No murmur heard.  Pulmonary/Chest: Breath sounds normal. He has no wheezes. He has no rhonchi. He has no rales.   Abdominal: Soft. There is no tenderness. There is no rebound and no guarding.   Musculoskeletal: Normal range of motion. He exhibits no edema or tenderness.   Tenderness to the anterior left deltoid with  Full ROM of the shoulder. No shoulder deformity.    Lymphadenopathy:     He has no cervical adenopathy.   Neurological: He is alert and oriented to person, place, and time. He has normal strength.   Neuro intact    Skin: Skin is warm and dry. No rash noted.   Psychiatric: Thought content normal.         ED Course   Procedures  Labs Reviewed - No data to display       Imaging Results    None          Medical Decision Making:   ED Management:  38-year-old male who experience pain to his left shoulder while carrying pipe at work.  Patient says his pain is not that bad, but his employer wanted his shoulder checked  before he returns to work.  I do not see the need for imaging at this time.  I have suggested he take Motrin if needed for pain. He will follow up as needed.                      Clinical Impression:     1. Acute pain of left shoulder            I, Dr. Nasir Montemayor, personally performed the services described in this documentation. All medical record entries made by the scribe were at my direction and in my presence. I have reviewed the chart and agree that the record reflects my personal performance and is accurate and complete. Nasir Montemayor MD.  7:48 PM 08/22/2018                   Nasir Montemayor MD  08/22/18 1951

## 2019-04-30 ENCOUNTER — HOSPITAL ENCOUNTER (EMERGENCY)
Facility: HOSPITAL | Age: 39
Discharge: HOME | End: 2019-04-30
Attending: EMERGENCY MEDICINE
Payer: MEDICAID

## 2019-04-30 VITALS
OXYGEN SATURATION: 97 % | SYSTOLIC BLOOD PRESSURE: 139 MMHG | RESPIRATION RATE: 16 BRPM | BODY MASS INDEX: 31.5 KG/M2 | DIASTOLIC BLOOD PRESSURE: 85 MMHG | HEIGHT: 71 IN | TEMPERATURE: 98 F | HEART RATE: 93 BPM | WEIGHT: 225 LBS

## 2019-04-30 DIAGNOSIS — S39.012A LUMBAR STRAIN, INITIAL ENCOUNTER: Primary | ICD-10-CM

## 2019-04-30 PROCEDURE — 99283 EMERGENCY DEPT VISIT LOW MDM: CPT

## 2019-04-30 RX ORDER — IBUPROFEN 600 MG/1
600 TABLET ORAL EVERY 6 HOURS PRN
Qty: 20 TABLET | Refills: 0 | Status: SHIPPED | OUTPATIENT
Start: 2019-04-30 | End: 2020-10-01

## 2019-04-30 NOTE — ED PROVIDER NOTES
Encounter Date: 2019       History     Chief Complaint   Patient presents with    Back Pain     Pain to lower back started today. Reports yesterday had bent over and believes he may have strained it. Took Aleve around 0730 with some relief.      38-year-old male with presents with bilateral lower back pain that began yesterday after he picked up something heavy while at work yesterday.  Patient has been taking Aleve which has been reducing his pain but he still sore.  Patient went to work today and his job would like him to get a medical release to return to work.  Patient denies any numbness, tingling, loss of urine or bowel.    The history is provided by the patient.     Review of patient's allergies indicates:  No Known Allergies  History reviewed. No pertinent past medical history.  History reviewed. No pertinent surgical history.  No family history on file.  Social History     Tobacco Use    Smoking status: Current Every Day Smoker     Packs/day: 0.50     Types: Cigarettes     Last attempt to quit: 3/2/2018     Years since quittin.1    Smokeless tobacco: Never Used   Substance Use Topics    Alcohol use: No    Drug use: No     Review of Systems   Constitutional: Negative for fever.   HENT: Negative for sore throat.    Respiratory: Negative for shortness of breath.    Cardiovascular: Negative for chest pain.   Gastrointestinal: Negative for nausea.   Genitourinary: Negative for dysuria.   Musculoskeletal: Positive for back pain and myalgias.   Skin: Negative for rash.   Neurological: Negative for weakness and numbness.   Hematological: Does not bruise/bleed easily.   All other systems reviewed and are negative.    Physical Exam     Initial Vitals [19 1029]   BP Pulse Resp Temp SpO2   139/85 93 16 98.3 °F (36.8 °C) 97 %      MAP       --         Physical Exam    Nursing note and vitals reviewed.  Constitutional: He appears well-developed and well-nourished.   HENT:   Head: Normocephalic and  atraumatic.   Eyes: Conjunctivae and EOM are normal. Pupils are equal, round, and reactive to light.   Cardiovascular: Normal rate, regular rhythm, normal heart sounds and intact distal pulses. Exam reveals no gallop and no friction rub.    No murmur heard.  Pulmonary/Chest: Breath sounds normal. No respiratory distress. He has no wheezes. He has no rhonchi. He has no rales. He exhibits no tenderness.   Musculoskeletal: Normal range of motion. He exhibits tenderness. He exhibits no edema.        Back:    Neurological: He is alert and oriented to person, place, and time. He has normal strength. GCS score is 15. GCS eye subscore is 4. GCS verbal subscore is 5. GCS motor subscore is 6.       ED Course   Procedures  Labs Reviewed - No data to display        Medical Decision Making:   Initial Assessment:   38-year-old male with presents with bilateral lower back pain that began yesterday after he picked up something heavy while at work yesterday.  Patient has been taking Aleve which has been reducing his pain but he still sore.  Patient went to work today and his job would like him to get a medical release to return to work.  Patient denies any numbness, tingling, loss of urine or bowel.    Differential Diagnosis:   Lumbar strain, cauda equina  ED Management:  Patient examined and noted to have bilateral lumbar tenderness without bony tenderness. Patient given Motrin 600 mg tablets at discharge. Patient advised that he is able to return to work without restrictions patient follow proper body mechanics.  Patient given strict return precautions and voiced understanding of all discharge instructions.  Patient stable at discharge.                   ED Course as of Apr 30 1051   Tue Apr 30, 2019   1037 BP: 139/85 [AT]   1038 Temp: 98.3 °F (36.8 °C) [AT]   1038 Temp src: Oral [AT]   1038 Pulse: 93 [AT]   1038 Resp: 16 [AT]   1038 SpO2: 97 % [AT]      ED Course User Index  [AT] MICHOACANO Tee     Clinical Impression:        ICD-10-CM ICD-9-CM   1. Lumbar strain, initial encounter S39.012A 847.2                                Nalini Bloom, [facility]  04/30/19 1054

## 2019-04-30 NOTE — ED NOTES
APPEARANCE: Alert, oriented and in no acute distress.  CARDIAC: Normal rate and rhythm.   PERIPHERAL VASCULAR: peripheral pulses present. Normal cap refill. No edema. Warm to touch.    RESPIRATORY:Normal rate and effort, . Respirations are equal and unlabored no obvious signs of distress.  GASTRO: soft, no tenderness, no abdominal distention.  SKIN: Skin is warm and dry, normal skin turgor, mucous membranes moist.  MENTAL STATUS: awake, alert and aware of environment.  EYE: PERRL, both eyes: pupils brisk and reactive to light. Normal size.  ENT: EARS: no obvious drainage. NOSE: no active bleeding.

## 2019-04-30 NOTE — ED NOTES
Pt escorted to Registration with all discharge paperwork and prescriptions in green folder.  Pt ambulates with steady gait.

## 2019-04-30 NOTE — ED NOTES
Lower back pain that started yesterday after bending over.  Pain improved with Aleve today but still remains.  Denies dysuria, hematuria, nausea/vomiting/fever.  Ambulates with steady gait.  Denies numbness/tingling/urine or fecal incontinence.

## 2019-12-31 ENCOUNTER — HOSPITAL ENCOUNTER (EMERGENCY)
Facility: HOSPITAL | Age: 39
Discharge: HOME | End: 2019-12-31
Attending: EMERGENCY MEDICINE

## 2019-12-31 VITALS
SYSTOLIC BLOOD PRESSURE: 156 MMHG | TEMPERATURE: 99 F | HEIGHT: 72 IN | HEART RATE: 81 BPM | RESPIRATION RATE: 16 BRPM | BODY MASS INDEX: 25.33 KG/M2 | WEIGHT: 187 LBS | DIASTOLIC BLOOD PRESSURE: 92 MMHG | OXYGEN SATURATION: 100 %

## 2019-12-31 DIAGNOSIS — L03.011 PARONYCHIA OF RIGHT MIDDLE FINGER: Primary | ICD-10-CM

## 2019-12-31 PROCEDURE — 99283 EMERGENCY DEPT VISIT LOW MDM: CPT | Mod: 25

## 2019-12-31 PROCEDURE — 10060 I&D ABSCESS SIMPLE/SINGLE: CPT

## 2019-12-31 RX ORDER — CEPHALEXIN 500 MG/1
500 CAPSULE ORAL 4 TIMES DAILY
Qty: 20 CAPSULE | Refills: 0 | Status: SHIPPED | OUTPATIENT
Start: 2019-12-31 | End: 2020-01-05

## 2019-12-31 NOTE — ED PROVIDER NOTES
Encounter Date: 2019    SCRIBE #1 NOTE: I, Eran Delgadillo, am scribing for, and in the presence of,  Dr. Garg. I have scribed the entire note.       History     Chief Complaint   Patient presents with    Hand Problem     c/o pain and swelling to right 3rd finger since Saturday. Unsure if he was bit by something there     Time seen by provider: 10:06 AM    This is a 39 y.o. male who presents with complaint of right middle finger pain and swelling. The patient reports progressively worsening pain and swelling around the nail of his right middle finger beginning 3 days ago. He notes that he does bite his nails, and does not report any other symptoms.    The history is provided by the patient.     Review of patient's allergies indicates:  No Known Allergies  History reviewed. No pertinent past medical history.  History reviewed. No pertinent surgical history.  History reviewed. No pertinent family history.  Social History     Tobacco Use    Smoking status: Current Every Day Smoker     Packs/day: 0.50     Types: Cigarettes     Last attempt to quit: 3/2/2018     Years since quittin.8    Smokeless tobacco: Never Used   Substance Use Topics    Alcohol use: No    Drug use: No     Review of Systems   Constitutional: Negative for fever.   HENT: Negative for sore throat.    Respiratory: Negative for cough and shortness of breath.    Cardiovascular: Negative for chest pain.   Gastrointestinal: Negative for abdominal pain, diarrhea, nausea and vomiting.   Genitourinary: Negative for dysuria and flank pain.   Musculoskeletal: Negative for back pain.        Left middle finger pain and swelling   Skin: Negative for rash.   Neurological: Negative for weakness.   Hematological: Does not bruise/bleed easily.   Psychiatric/Behavioral: Negative.  Negative for confusion and hallucinations.       Physical Exam     Initial Vitals [19 0957]   BP Pulse Resp Temp SpO2   (!) 156/92 81 16 98.5 °F (36.9 °C) 100 %      MAP        --         Physical Exam    Nursing note and vitals reviewed.  Constitutional: He appears well-developed and well-nourished. No distress.   Skin: Skin is warm and dry. Capillary refill takes less than 2 seconds.   Paronychia to the right middle finger  Swelling and fluctuance and tenderness around the nailbed         ED Course   I & D - Incision and Drainage  Date/Time: 12/31/2019 11:10 AM  Performed by: Debbie Garg MD  Authorized by: Debbie Garg MD   Indications for incision and drainage: paronychia.  Location: right middle finger.  Anesthesia: local infiltration    Anesthesia:  Local Anesthetic: lidocaine 1% without epinephrine  Anesthetic total: 1 mL  Patient sedated: no  Scalpel size: 11  Incision type: single straight  Complexity: simple  Drainage: pus and  bloody  Drainage amount: moderate  Wound treatment: incision,  drainage and  expression of material  Patient tolerance: Patient tolerated the procedure well with no immediate complications        Labs Reviewed - No data to display       Imaging Results    None                                          Clinical Impression:       ICD-10-CM ICD-9-CM   1. Paronychia of right middle finger L03.011 681.02       Disposition:   Disposition: Discharged  Condition: Stable    IDebbie, personally performed the services described in this documentation. All medical record entries made by the scribe were at my direction and in my presence.  I have reviewed the chart and agree that the record reflects my personal performance and is accurate and complete. Debbie Garg M.D. 11:12 AM12/31/2019         Debbie Garg MD  12/31/19 1112

## 2020-04-27 ENCOUNTER — OFFICE VISIT (OUTPATIENT)
Dept: URGENT CARE | Facility: CLINIC | Age: 40
End: 2020-04-27
Payer: COMMERCIAL

## 2020-04-27 VITALS
OXYGEN SATURATION: 99 % | RESPIRATION RATE: 16 BRPM | WEIGHT: 187 LBS | BODY MASS INDEX: 25.33 KG/M2 | TEMPERATURE: 98 F | HEIGHT: 72 IN | HEART RATE: 106 BPM

## 2020-04-27 DIAGNOSIS — H66.92 LEFT OTITIS MEDIA, UNSPECIFIED OTITIS MEDIA TYPE: ICD-10-CM

## 2020-04-27 DIAGNOSIS — J02.9 PHARYNGITIS WITH VIRAL SYNDROME: Primary | ICD-10-CM

## 2020-04-27 DIAGNOSIS — B34.9 PHARYNGITIS WITH VIRAL SYNDROME: Primary | ICD-10-CM

## 2020-04-27 PROCEDURE — 99214 OFFICE O/P EST MOD 30 MIN: CPT | Mod: S$GLB,,, | Performed by: NURSE PRACTITIONER

## 2020-04-27 PROCEDURE — 99214 PR OFFICE/OUTPT VISIT, EST, LEVL IV, 30-39 MIN: ICD-10-PCS | Mod: S$GLB,,, | Performed by: NURSE PRACTITIONER

## 2020-04-27 RX ORDER — AMOXICILLIN 875 MG/1
875 TABLET, FILM COATED ORAL EVERY 12 HOURS
Qty: 20 TABLET | Refills: 0 | Status: SHIPPED | OUTPATIENT
Start: 2020-04-27 | End: 2020-05-07

## 2020-04-27 NOTE — PROGRESS NOTES
Subjective:       Patient ID: Hermilo Catherine is a 39 y.o. male.    Vitals:  height is 6' (1.829 m) and weight is 84.8 kg (187 lb). His tympanic temperature is 98.3 °F (36.8 °C). His pulse is 106. His respiration is 16 and oxygen saturation is 99%.     Chief Complaint: Sore Throat and Cough      Patient denies shortness of breath or Anosmia.  Denies fever.   Patient does have a new cough.           Sore Throat    This is a new problem. The current episode started yesterday. The problem has been unchanged. The pain is worse on the right side. There has been no fever. The pain is at a severity of 4/10. The pain is mild. Associated symptoms include coughing and trouble swallowing. Pertinent negatives include no congestion, diarrhea, headaches, shortness of breath or vomiting. He has tried nothing for the symptoms.   Cough   Associated symptoms include a sore throat. Pertinent negatives include no chest pain, chills, fever, headaches, myalgias, rash or shortness of breath.       Constitution: Negative for chills, fatigue and fever.   HENT: Positive for sore throat and trouble swallowing. Negative for congestion.         R side   Neck: Negative for painful lymph nodes.   Cardiovascular: Negative for chest pain and leg swelling.   Eyes: Negative for double vision and blurred vision.   Respiratory: Positive for cough and sputum production. Negative for shortness of breath.    Gastrointestinal: Negative for nausea, vomiting and diarrhea.   Genitourinary: Negative for dysuria, frequency and urgency.   Musculoskeletal: Negative for joint pain, joint swelling, muscle cramps and muscle ache.   Skin: Negative for color change, pale and rash.   Allergic/Immunologic: Negative for seasonal allergies.   Neurological: Negative for dizziness, history of vertigo, light-headedness, passing out and headaches.   Hematologic/Lymphatic: Negative for swollen lymph nodes, easy bruising/bleeding and history of blood clots. Does not bruise/bleed  easily.   Psychiatric/Behavioral: Negative for nervous/anxious, sleep disturbance and depression. The patient is not nervous/anxious.        Objective:      Physical Exam   Constitutional: He is oriented to person, place, and time. He appears well-developed and well-nourished. He is active and cooperative.  Non-toxic appearance. He does not have a sickly appearance. He does not appear ill. No distress.   HENT:   Head: Normocephalic and atraumatic.   Right Ear: Hearing, tympanic membrane, external ear and ear canal normal.   Left Ear: Hearing, external ear and ear canal normal. Tympanic membrane is injected.   Nose: Mucosal edema and rhinorrhea present. No purulent discharge or nasal deformity. No epistaxis. Right sinus exhibits no maxillary sinus tenderness and no frontal sinus tenderness. Left sinus exhibits no maxillary sinus tenderness and no frontal sinus tenderness.   Mouth/Throat: Uvula is midline and mucous membranes are normal. No trismus in the jaw. Normal dentition. No uvula swelling. Posterior oropharyngeal erythema present. No oropharyngeal exudate, posterior oropharyngeal edema or tonsillar abscesses.   Eyes: Conjunctivae and lids are normal. No scleral icterus.   Neck: Trachea normal, full passive range of motion without pain and phonation normal. Neck supple. No neck rigidity. No edema and no erythema present.   Cardiovascular: Normal rate, regular rhythm, S1 normal, S2 normal, normal heart sounds, intact distal pulses and normal pulses.  No extrasystoles are present.   Pulmonary/Chest: Effort normal and breath sounds normal. No accessory muscle usage or stridor. No tachypnea. No respiratory distress. He has no decreased breath sounds. He has no wheezes. He has no rhonchi. He has no rales.   Abdominal: Normal appearance.   Musculoskeletal: Normal range of motion. He exhibits no edema or deformity.   Lymphadenopathy:     He has no cervical adenopathy.        Right cervical: No superficial cervical, no  deep cervical and no posterior cervical adenopathy present.       Left cervical: No superficial cervical, no deep cervical and no posterior cervical adenopathy present.   Neurological: He is alert and oriented to person, place, and time. He exhibits normal muscle tone. Coordination normal.   Skin: Skin is warm, dry, intact, not diaphoretic and not pale.   Psychiatric: He has a normal mood and affect. His speech is normal and behavior is normal. Judgment and thought content normal. Cognition and memory are normal.   Nursing note and vitals reviewed.        Assessment:       1. Pharyngitis with viral syndrome    2. Left otitis media, unspecified otitis media type        Plan:     Patient candidate for COVID-19 testing.  Patient refused covid-19 testing at this time.      Pharyngitis with viral syndrome  -     (Magic mouthwash) 1:1:1 Benadryl 12.5mg/5ml liq, aluminum & magnesium hydroxide-simehticone (Maalox), lidocaine viscous 2%; Swish and spit 10 mLs every 4 (four) hours as needed. Gargle and spit for sore throat  Dispense: 90 mL; Refill: 0    Left otitis media, unspecified otitis media type  -     amoxicillin (AMOXIL) 875 MG tablet; Take 1 tablet (875 mg total) by mouth every 12 (twelve) hours. for 10 days  Dispense: 20 tablet; Refill: 0      Patient Instructions   Please follow up with your Primary care provider within 2-5 days if your signs and symptoms have not resolved or worsen.  The usual course of cold symptoms are 10-14 days.     If your condition worsens or fails to improve we recommend that you receive another evaluation at the emergency room immediately or contact your primary medical clinic to discuss your concerns.     You must understand that you have received an Urgent Care treatment only and that you may be released before all of your medical problems are known or treated.   You, the patient, will arrange for follow up care as instructed.     Tylenol or Ibuprofen can also be used as directed for  "pain/fever unless you have an allergy to them or medical condition such as stomach ulcers, kidney or liver disease or blood thinners etc for which you should not be taking these type of medications.     Take over the counter cough medication as directed as needed for cough.  You should avoid medications with pseudoephedrine or phenylephrine (any medication with "D") if you have high blood pressure as this can cause an elevation in your blood pressure. Instead consider Corcidin HBP as needed to prevent an elevated blood pressure.     Natural remedies of symptoms (as needed) include humidification, saline nasal sprays, and/or steamy showers.  Increase fluids, warm tea with honey, cough drops as needed.  You may also use salt water gargles for sore throat.    IF you received a oral steroid today - As discussed, this can elevate your blood pressure, elevate your blood sugar, water weight gain, nervous energy, redness to the face and dimpling of the skin at the injection site.     Wait and See Antibiotic    You have been given a "wait and see" antibiotic. You should wait to see if symptoms improve within the next 48-72 hours before you start the antibiotic.      It is important to follow these instructions as antibiotic resistance is high.  Your symptoms will likely resolve without this prescription.      If you do start the antibiotic, please complete the antibiotic as directed on the bottle.      If you are female and on BCP use additional methods to prevent pregnancy while on antibiotics and for one cycle after.     You have been given an antibiotic to treat your condition today.  Please complete the antibiotic as directed on the bottle.     As with any antibiotics, use probiotics and/or high culture yogurt about 2 hours apart from the antibiotic and about 1 week after the antibiotic to replace the gut jordy lost with antibiotic use.      If you are female and on BCP use additional methods to prevent pregnancy while " "on antibiotics and for one cycle after.         Viral Syndrome (Adult)  A viral illness may cause a number of symptoms. The symptoms depend on the part of the body that the virus affects. If it settles in your nose, throat, and lungs, it may cause cough, sore throat, congestion, and sometimes headache. If it settles in your stomach and intestinal tract, it may cause vomiting and diarrhea. Sometimes it causes vague symptoms like "aching all over," feeling tired, loss of appetite, or fever.  A viral illness usually lasts 1 to 2 weeks, but sometimes it lasts longer. In some cases, a more serious infection can look like a viral syndrome in the first few days of the illness. You may need another exam and additional tests to know the difference. Watch for the warning signs listed below.  Home care  Follow these guidelines for taking care of yourself at home:  · If symptoms are severe, rest at home for the first 2 to 3 days.  · Stay away from cigarette smoke - both your smoke and the smoke from others.  · You may use over-the-counter acetaminophen or ibuprofen for fever, muscle aching, and headache, unless another medicine was prescribed for this. If you have chronic liver or kidney disease or ever had a stomach ulcer or GI bleeding, talk with your doctor before using these medicines. No one who is younger than 18 and ill with a fever should take aspirin. It may cause severe disease or death.  · Your appetite may be poor, so a light diet is fine. Avoid dehydration by drinking 8 to 12 8-ounce glasses of fluids each day. This may include water; orange juice; lemonade; apple, grape, and cranberry juice; clear fruit drinks; electrolyte replacement and sports drinks; and decaffeinated teas and coffee. If you have been diagnosed with a kidney disease, ask your doctor how much and what types of fluids you should drink to prevent dehydration. If you have kidney disease, drinking too much fluid can cause it build up in the your " body and be dangerous to your health.  · Over-the-counter remedies won't shorten the length of the illness but may be helpful for cough, sore throat; and nasal and sinus congestion. Don't use decongestants if you have high blood pressure.  Follow-up care  Follow up with your healthcare provider if you do not improve over the next week.  Call 911  Get emergency medical care if any of the following occur:  · Convulsion  · Feeling weak, dizzy, or like you are going to faint  · Chest pain, shortness of breath, wheezing, or difficulty breathing  When to seek medical advice  Call your healthcare provider right away if any of these occur:  · Cough with lots of colored sputum (mucus) or blood in your sputum  · Chest pain, shortness of breath, wheezing, or difficulty breathing  · Severe headache; face, neck, or ear pain  · Severe, constant pain in the lower right side of your belly (abdominal)  · Continued vomiting (cant keep liquids down)  · Frequent diarrhea (more than 5 times a day); blood (red or black color) or mucus in diarrhea  · Feeling weak, dizzy, or like you are going to faint  · Extreme thirst  · Fever of 100.4°F (38°C) or higher, or as directed by your healthcare provider  Date Last Reviewed: 9/25/2015  © 9701-9209 [employer]. [address] [place] [address]. All rights reserved. This information is not intended as a substitute for professional medical care. Always follow your healthcare professional's instructions.        Middle Ear Infection (Adult)  You have an infection of the middle ear, the space behind the eardrum. This is also called acute otitis media (AOM). Sometimes it is caused by the common cold. This is because congestion can block the internal passage (eustachian tube) that drains fluid from the middle ear. When the middle ear fills with fluid, bacteria can grow there and cause an infection. Oral antibiotics are used to treat this illness, not ear drops. Symptoms usually  start to improve within 1 to 2 days of treatment.    Home care  The following are general care guidelines:  · Finish all of the antibiotic medicine given, even though you may feel better after the first few days.  · You may use over-the-counter medicine, such as acetaminophen or ibuprofen, to control pain and fever, unless something else was prescribed. If you have chronic liver or kidney disease or have ever had a stomach ulcer or gastrointestinal bleeding, talk with your healthcare provider before using these medicines. Do not give aspirin to anyone under 18 years of age who has a fever. It may cause severe illness or death.  Follow-up care  Follow up with your healthcare provider, or as advised, in 2 weeks if all symptoms have not gotten better, or if hearing doesn't go back to normal within 1 month.  When to seek medical advice  Call your healthcare provider right away if any of these occur:  · Ear pain gets worse or does not improve after 3 days of treatment  · Unusual drowsiness or confusion  · Neck pain, stiff neck, or headache  · Fluid or blood draining from the ear canal  · Fever of 100.4°F (38°C) or as advised   · Seizure  Date Last Reviewed: 6/1/2016  © 7959-8452 [employer]. [address] [place] [address], [place] [address]. All rights reserved. This information is not intended as a substitute for professional medical care. Always follow your healthcare professional's instructions.

## 2020-04-27 NOTE — PATIENT INSTRUCTIONS
"Please follow up with your Primary care provider within 2-5 days if your signs and symptoms have not resolved or worsen.  The usual course of cold symptoms are 10-14 days.     If your condition worsens or fails to improve we recommend that you receive another evaluation at the emergency room immediately or contact your primary medical clinic to discuss your concerns.     You must understand that you have received an Urgent Care treatment only and that you may be released before all of your medical problems are known or treated.   You, the patient, will arrange for follow up care as instructed.     Tylenol or Ibuprofen can also be used as directed for pain/fever unless you have an allergy to them or medical condition such as stomach ulcers, kidney or liver disease or blood thinners etc for which you should not be taking these type of medications.     Take over the counter cough medication as directed as needed for cough.  You should avoid medications with pseudoephedrine or phenylephrine (any medication with "D") if you have high blood pressure as this can cause an elevation in your blood pressure. Instead consider Corcidin HBP as needed to prevent an elevated blood pressure.     Natural remedies of symptoms (as needed) include humidification, saline nasal sprays, and/or steamy showers.  Increase fluids, warm tea with honey, cough drops as needed.  You may also use salt water gargles for sore throat.    IF you received a oral steroid today - As discussed, this can elevate your blood pressure, elevate your blood sugar, water weight gain, nervous energy, redness to the face and dimpling of the skin at the injection site.     Wait and See Antibiotic    You have been given a "wait and see" antibiotic. You should wait to see if symptoms improve within the next 48-72 hours before you start the antibiotic.      It is important to follow these instructions as antibiotic resistance is high.  Your symptoms will likely resolve " "without this prescription.      If you do start the antibiotic, please complete the antibiotic as directed on the bottle.      If you are female and on BCP use additional methods to prevent pregnancy while on antibiotics and for one cycle after.     You have been given an antibiotic to treat your condition today.  Please complete the antibiotic as directed on the bottle.     As with any antibiotics, use probiotics and/or high culture yogurt about 2 hours apart from the antibiotic and about 1 week after the antibiotic to replace the gut jordy lost with antibiotic use.      If you are female and on BCP use additional methods to prevent pregnancy while on antibiotics and for one cycle after.         Viral Syndrome (Adult)  A viral illness may cause a number of symptoms. The symptoms depend on the part of the body that the virus affects. If it settles in your nose, throat, and lungs, it may cause cough, sore throat, congestion, and sometimes headache. If it settles in your stomach and intestinal tract, it may cause vomiting and diarrhea. Sometimes it causes vague symptoms like "aching all over," feeling tired, loss of appetite, or fever.  A viral illness usually lasts 1 to 2 weeks, but sometimes it lasts longer. In some cases, a more serious infection can look like a viral syndrome in the first few days of the illness. You may need another exam and additional tests to know the difference. Watch for the warning signs listed below.  Home care  Follow these guidelines for taking care of yourself at home:  · If symptoms are severe, rest at home for the first 2 to 3 days.  · Stay away from cigarette smoke - both your smoke and the smoke from others.  · You may use over-the-counter acetaminophen or ibuprofen for fever, muscle aching, and headache, unless another medicine was prescribed for this. If you have chronic liver or kidney disease or ever had a stomach ulcer or GI bleeding, talk with your doctor before using these " medicines. No one who is younger than 18 and ill with a fever should take aspirin. It may cause severe disease or death.  · Your appetite may be poor, so a light diet is fine. Avoid dehydration by drinking 8 to 12 8-ounce glasses of fluids each day. This may include water; orange juice; lemonade; apple, grape, and cranberry juice; clear fruit drinks; electrolyte replacement and sports drinks; and decaffeinated teas and coffee. If you have been diagnosed with a kidney disease, ask your doctor how much and what types of fluids you should drink to prevent dehydration. If you have kidney disease, drinking too much fluid can cause it build up in the your body and be dangerous to your health.  · Over-the-counter remedies won't shorten the length of the illness but may be helpful for cough, sore throat; and nasal and sinus congestion. Don't use decongestants if you have high blood pressure.  Follow-up care  Follow up with your healthcare provider if you do not improve over the next week.  Call 911  Get emergency medical care if any of the following occur:  · Convulsion  · Feeling weak, dizzy, or like you are going to faint  · Chest pain, shortness of breath, wheezing, or difficulty breathing  When to seek medical advice  Call your healthcare provider right away if any of these occur:  · Cough with lots of colored sputum (mucus) or blood in your sputum  · Chest pain, shortness of breath, wheezing, or difficulty breathing  · Severe headache; face, neck, or ear pain  · Severe, constant pain in the lower right side of your belly (abdominal)  · Continued vomiting (cant keep liquids down)  · Frequent diarrhea (more than 5 times a day); blood (red or black color) or mucus in diarrhea  · Feeling weak, dizzy, or like you are going to faint  · Extreme thirst  · Fever of 100.4°F (38°C) or higher, or as directed by your healthcare provider  Date Last Reviewed: 9/25/2015  © 9086-9098 [employer]. [address] [place] [address],  [place] [address]. All rights reserved. This information is not intended as a substitute for professional medical care. Always follow your healthcare professional's instructions.        Middle Ear Infection (Adult)  You have an infection of the middle ear, the space behind the eardrum. This is also called acute otitis media (AOM). Sometimes it is caused by the common cold. This is because congestion can block the internal passage (eustachian tube) that drains fluid from the middle ear. When the middle ear fills with fluid, bacteria can grow there and cause an infection. Oral antibiotics are used to treat this illness, not ear drops. Symptoms usually start to improve within 1 to 2 days of treatment.    Home care  The following are general care guidelines:  · Finish all of the antibiotic medicine given, even though you may feel better after the first few days.  · You may use over-the-counter medicine, such as acetaminophen or ibuprofen, to control pain and fever, unless something else was prescribed. If you have chronic liver or kidney disease or have ever had a stomach ulcer or gastrointestinal bleeding, talk with your healthcare provider before using these medicines. Do not give aspirin to anyone under 18 years of age who has a fever. It may cause severe illness or death.  Follow-up care  Follow up with your healthcare provider, or as advised, in 2 weeks if all symptoms have not gotten better, or if hearing doesn't go back to normal within 1 month.  When to seek medical advice  Call your healthcare provider right away if any of these occur:  · Ear pain gets worse or does not improve after 3 days of treatment  · Unusual drowsiness or confusion  · Neck pain, stiff neck, or headache  · Fluid or blood draining from the ear canal  · Fever of 100.4°F (38°C) or as advised   · Seizure  Date Last Reviewed: 6/1/2016  © 1032-9365 [employer]. [address] [place] [address], [place] [address]. All rights reserved. This  information is not intended as a substitute for professional medical care. Always follow your healthcare professional's instructions.

## 2020-04-27 NOTE — LETTER
April 27, 2020      [facility]  [place] [address]  Phone: [phone]  Fax: [phone]       Patient: Hermilo Catherine   YOB: 1980  Date of Visit: 04/27/2020    To Whom It May Concern:    Valentine Catherine  was at [facility] on 04/27/2020. He may return to work/school on 5/4/2020 and fever free x 72 hours with no restrictions. If you have any questions or concerns, or if I can be of further assistance, please do not hesitate to contact me.    Sincerely,    Araceli Singh, NP

## 2020-04-30 ENCOUNTER — TELEPHONE (OUTPATIENT)
Dept: URGENT CARE | Facility: CLINIC | Age: 40
End: 2020-04-30

## 2020-10-01 ENCOUNTER — OFFICE VISIT (OUTPATIENT)
Dept: URGENT CARE | Facility: CLINIC | Age: 40
End: 2020-10-01
Payer: COMMERCIAL

## 2020-10-01 VITALS
HEART RATE: 88 BPM | RESPIRATION RATE: 18 BRPM | SYSTOLIC BLOOD PRESSURE: 129 MMHG | DIASTOLIC BLOOD PRESSURE: 88 MMHG | HEIGHT: 72 IN | TEMPERATURE: 99 F | BODY MASS INDEX: 30.48 KG/M2 | OXYGEN SATURATION: 99 % | WEIGHT: 225 LBS

## 2020-10-01 DIAGNOSIS — M79.18 MYOFASCIAL MUSCLE PAIN: ICD-10-CM

## 2020-10-01 DIAGNOSIS — J30.9 ALLERGIC RHINITIS, UNSPECIFIED SEASONALITY, UNSPECIFIED TRIGGER: ICD-10-CM

## 2020-10-01 DIAGNOSIS — M54.50 ACUTE LEFT-SIDED LOW BACK PAIN, UNSPECIFIED WHETHER SCIATICA PRESENT: Primary | ICD-10-CM

## 2020-10-01 DIAGNOSIS — Z76.89 ENCOUNTER TO ESTABLISH CARE WITH NEW DOCTOR: ICD-10-CM

## 2020-10-01 PROCEDURE — 99214 OFFICE O/P EST MOD 30 MIN: CPT | Mod: S$GLB,,, | Performed by: NURSE PRACTITIONER

## 2020-10-01 PROCEDURE — 99214 PR OFFICE/OUTPT VISIT, EST, LEVL IV, 30-39 MIN: ICD-10-PCS | Mod: S$GLB,,, | Performed by: NURSE PRACTITIONER

## 2020-10-01 RX ORDER — NAPROXEN 250 MG/1
250 TABLET ORAL 2 TIMES DAILY PRN
Qty: 30 TABLET | Refills: 0 | Status: SHIPPED | OUTPATIENT
Start: 2020-10-01 | End: 2020-10-16

## 2020-10-01 RX ORDER — METHOCARBAMOL 500 MG/1
500 TABLET, FILM COATED ORAL 2 TIMES DAILY PRN
Qty: 20 TABLET | Refills: 0 | Status: SHIPPED | OUTPATIENT
Start: 2020-10-01 | End: 2020-10-16

## 2020-10-01 RX ORDER — AZELASTINE 1 MG/ML
1 SPRAY, METERED NASAL 2 TIMES DAILY PRN
Qty: 30 ML | Refills: 0 | Status: SHIPPED | OUTPATIENT
Start: 2020-10-01

## 2020-10-01 NOTE — PROGRESS NOTES
Subjective:       Patient ID: Hermilo Catherine is a 40 y.o. male.    Vitals:  height is 6' (1.829 m) and weight is 102.1 kg (225 lb). His temporal temperature is 98.5 °F (36.9 °C). His blood pressure is 129/88 and his pulse is 88. His respiration is 18 and oxygen saturation is 99%.     Chief Complaint: Back Pain (lower/midback)    This is a 40 y.o. male who presents today with a chief complaint of low back pain that started on Tuesday, patient reports he was picking up something at home and after that he started feeling pain on his left side lower back , patient reports he took Tylenol with mild relief, patient denies urinary frequency, urgency or dysuria, denies hematuria, patient reports pain 2/10 currently, patient reports he is currently using the heating pad, patient denies chest pain or exertional chest pain, denies dizziness or positional lightheadedness, denies radiating back and leg pain, denies numbness and tingling, denies bladder or bowel incontinence, saddle anesthesia, gait instability or extremity weakness, denies any trauma or falls, patient reports he has been seen in the ER for his back pain before and was prescribed muscle relaxer but never took it because he was afraid that it will make him sleepy, patient is requesting note for work, denies cough or shortness of breath, denies headache, denies loss of taste or smell, denies any exposure to COVID-19 and is not concerned about COVID-19        Back Pain  This is a new problem. The current episode started in the past 7 days (x2 days). The problem occurs constantly. The problem has been gradually improving since onset. The pain is present in the lumbar spine. The quality of the pain is described as aching. The pain does not radiate. The pain is at a severity of 5/10. The pain is mild. The pain is worse during the day. The symptoms are aggravated by bending and twisting. Pertinent negatives include no abdominal pain, bladder incontinence, bowel  incontinence, dysuria or numbness. Risk factors include lack of exercise. He has tried NSAIDs for the symptoms. The treatment provided moderate relief.       Constitution: Negative for fatigue.   HENT: Negative for ear pain.    Cardiovascular: Negative for chest trauma.   Eyes: Negative for eye trauma.   Respiratory: Negative for sleep apnea.    Gastrointestinal: Negative for abdominal pain and bowel incontinence.   Endocrine: hair loss.   Genitourinary: Negative for dysuria, urgency, bladder incontinence and hematuria.   Musculoskeletal: Positive for back pain. Negative for muscle cramps and history of spine disorder.   Skin: Negative for rash.   Allergic/Immunologic: Negative for environmental allergies.   Neurological: Negative for coordination disturbances, disorientation, altered mental status, numbness and tingling.   Psychiatric/Behavioral: Negative for altered mental status, disorientation and nervous/anxious. The patient is not nervous/anxious.        Objective:      Physical Exam   Constitutional: He is oriented to person, place, and time. He appears well-developed. He is cooperative.  Non-toxic appearance. He does not appear ill. No distress.   HENT:   Head: Normocephalic and atraumatic.   Ears:   Right Ear: Hearing and tympanic membrane normal. No middle ear effusion.   Left Ear: Hearing and tympanic membrane normal.  No middle ear effusion.   Nose: Nose normal. No mucosal edema. Right sinus exhibits no maxillary sinus tenderness and no frontal sinus tenderness. Left sinus exhibits no maxillary sinus tenderness and no frontal sinus tenderness.   Mouth/Throat: Uvula is midline, oropharynx is clear and moist and mucous membranes are normal. No oropharyngeal exudate, posterior oropharyngeal edema, posterior oropharyngeal erythema, tonsillar abscesses or cobblestoning.   Eyes: Conjunctivae and lids are normal.   Neck: Trachea normal, normal range of motion, full passive range of motion without pain and  phonation normal. Neck supple.   Cardiovascular: Normal rate, regular rhythm, normal heart sounds and normal pulses.   Pulmonary/Chest: Effort normal and breath sounds normal.   Abdominal: Soft. Normal appearance and bowel sounds are normal. He exhibits no abdominal bruit, no pulsatile midline mass and no mass.   Musculoskeletal:         General: No deformity.      Lumbar back: He exhibits pain and spasm. He exhibits normal range of motion, no tenderness, no bony tenderness, no swelling, no edema, no deformity, no laceration and normal pulse.        Back:       Comments: No midline spine tenderness.  Point tenderness over left SI joint.   NEG ST LEG RAISE on the right/left  Full range of motion bilaterally with 5/5 strength  2+ DTR patella and Achilles  NVIT distally with SILT and 2+ BCR  Able to ambulate with smooth rhythmic gait       Lymphadenopathy:     He has no cervical adenopathy.        Right cervical: No superficial cervical adenopathy present.       Left cervical: No superficial cervical adenopathy present.   Neurological: He is alert and oriented to person, place, and time. He has normal motor skills, normal sensation, normal strength, normal reflexes and intact cranial nerves. No sensory deficit. Gait and coordination normal.   Skin: Skin is warm, dry, intact and not diaphoretic. Psychiatric: His speech is normal and behavior is normal. Judgment and thought content normal.   Nursing note and vitals reviewed.          Patient in no acute distress and nontoxic appearing.  Vitals reassuring.  Neuro exam negative.  Patient declined urinalysis.  Detailed education provided about muscle relaxer side effects.  Family practice referral in place to establish care.  Discussed results/diagnosis/plan in depth with patient in clinic. Strict precautions given to patient to monitor for worsening signs and symptoms. Advised to follow up with primary.All questions answered. Strict ER precautions given. If your symptoms  worsens of fail to improve you should go to the Emergency Room. Discharge and follow-up instructions given verbally/printed. Patient voiced understanding and in agreement with current treatment plan.        Assessment:       1. Acute left-sided low back pain, unspecified whether sciatica present    2. Encounter to establish care with new doctor    3. Myofascial muscle pain    4. Allergic rhinitis, unspecified seasonality, unspecified trigger        Plan:         Acute left-sided low back pain, unspecified whether sciatica present  -     methocarbamoL (ROBAXIN) 500 MG Tab; Take 1 tablet (500 mg total) by mouth 2 (two) times daily as needed.  Dispense: 20 tablet; Refill: 0  -     naproxen (NAPROSYN) 250 MG tablet; Take 1 tablet (250 mg total) by mouth 2 (two) times daily as needed (with meals).  Dispense: 30 tablet; Refill: 0    Encounter to establish care with new doctor  -     Ambulatory referral/consult to Family Practice    Myofascial muscle pain    Allergic rhinitis, unspecified seasonality, unspecified trigger  -     azelastine (ASTELIN) 137 mcg (0.1 %) nasal spray; 1 spray (137 mcg total) by Nasal route 2 (two) times daily as needed.  Dispense: 30 mL; Refill: 0      Patient Instructions   PLEASE READ YOUR DISCHARGE INSTRUCTIONS ENTIRELY AS IT CONTAINS IMPORTANT INFORMATION.      Please drink plenty of fluids.  Please get plenty of rest.  Ice to the area.   You may do gently stretching if tolerable.    Please return here or go to the Emergency Department for any concerns or worsening of condition.    If you were prescribed a narcotic medication or muscle relaxer (robaxin), do not drive or operate heavy equipment or machinery while taking these medications. Take a half first to see how it affects you    Take the naproxen with food. Also take an over the counter antacid with it (prilosec, Nexium, Pepcid) to protect your stomach.     If you were not prescribed an anti-inflammatory medication, and if you do not have  any history of stomach/intestinal ulcers, or kidney disease, or are not taking a blood thinner such as Coumadin, Plavix, Pradaxa, Eloquis, or Xaralta for example, it is OK to take over the counter Ibuprofen or Advil or Motrin or Aleve as directed.  Do not take these medications on an empty stomach.    If you lose control of your bowel and/or bladder, please go to the nearest Emergency Department immediately.    If you lose sensation in between your legs by your genitalia and/or rectum, please go to the nearest Emergency Department immediately.    If you lose control or sensation of any extremity, please go to the nearest Emergency Department immediately.    Do not stay in one position to long.  When sleeping on your back place a pillow under knees to reduce tension on back.  If sleeping on your side, place pillow between knees to keep spine in better alinement.  Wear supportive shoes such as tennis shoes for support of the lower back.  Take any medication as directed.    Please follow up with your primary care doctor or specialist as needed.    If you  smoke, please stop smoking.      Please arrange follow up with your primary medical clinic as soon as possible. You must understand that you've received an Urgent Care treatment only and that you may be released before all of your medical problems are known or treated. You, the patient, will arrange for follow up as instructed. If your symptoms worsen or fail to improve you should go to the Emergency Room.      Back Pain (Acute or Chronic)    Back pain is one of the most common problems. The good news is that most people feel better in 1 to 2 weeks, and most of the rest in 1 to 2 months. Most people can remain active.  People experience and describe pain differently; not everyone is the same.  · The pain can be sharp, stabbing, shooting, aching, cramping or burning.  · Movement, standing, bending, lifting, sitting, or walking may worsen pain.  · It can be localized to  one spot or area, or it can be more generalized.  · It can spread or radiate upwards, to the front, or go down your arms or legs (sciatica).  · It can cause muscle spasm.  Most of the time, mechanical problems with the muscles or spine cause the pain. Mechanical problems are usually caused by an injury to the muscles or ligaments. While illness can cause back pain, it is usually not caused by a serious illness. Mechanical problems include:   · Physical activity such as sports, exercise, work, or normal activity  · Overexertion, lifting, pushing, pulling incorrectly or too aggressively  · Sudden twisting, bending, or stretching from an accident, or accidental movement  · Poor posture  · Stretching or moving wrong, without noticing pain at the time  · Poor coordination, lack of regular exercise (check with your doctor about this)  · Spinal disc disease or arthritis  · Stress  Pain can also be related to pregnancy, or illness like appendicitis, bladder or kidney infections, pelvic infections, and many other things.  Acute back pain usually gets better in 1 to 2 weeks. Back pain related to disk disease, arthritis in the spinal joints or spinal stenosis (narrowing of the spinal canal) can become chronic and last for months or years.  Unless you had a physical injury (for example, a car accident or fall) X-rays are usually not needed for the initial evaluation of back pain. If pain continues and does not respond to medical treatment, X-rays and other tests may be needed.  Home care  Try these home care recommendations:  · When in bed, try to find a position of comfort. A firm mattress is best. Try lying flat on your back with pillows under your knees. You can also try lying on your side with your knees bent up towards your chest and a pillow between your knees.  · At first, do not try to stretch out the sore spots. If there is a strain, it is not like the good soreness you get after exercising without an injury. In this  case, stretching may make it worse.  · Avoid prolong sitting, long car rides, or travel. This puts more stress on the lower back than standing or walking.  · During the first 24 to 72 hours after an acute injury or flare up of chronic back pain, apply an ice pack to the painful area for 20 minutes and then remove it for 20 minutes. Do this over a period of 60 to 90 minutes or several times a day. This will reduce swelling and pain. Wrap the ice pack in a thin towel or plastic to protect your skin.  · You can start with ice, then switch to heat. Heat (hot shower, hot bath, or heating pad) reduces pain and works well for muscle spasms. Heat can be applied to the painful area for 20 minutes then remove it for 20 minutes. Do this over a period of 60 to 90 minutes or several times a day. Do not sleep on a heating pad. It can lead to skin burns or tissue damage.  · You can alternate ice and heat therapy. Talk with your doctor about the best treatment for your back pain.  · Therapeutic massage can help relax the back muscles without stretching them.  · Be aware of safe lifting methods and do not lift anything without stretching first.  Medicines  Talk to your doctor before using medicine, especially if you have other medical problems or are taking other medicines.  · You may use over-the-counter medicine as directed on the bottle to control pain, unless another pain medicine was prescribed. If you have chronic conditions like diabetes, liver or kidney disease, stomach ulcers, or gastrointestinal bleeding, or are taking blood thinners, talk to your doctor before taking any medicine.  · Be careful if you are given a prescription medicines, narcotics, or medicine for muscle spasms. They can cause drowsiness, affect your coordination, reflexes, and judgement. Do not drive or operate heavy machinery.  Follow-up care  Follow up with your healthcare provider, or as advised.   A radiologist will review any X-rays that were taken.  Your provide will notify you of any new findings that may affect your care.  Call 911  Call emergency services if any of the following occur:  · Trouble breathing  · Confusion  · Very drowsy or trouble awakening  · Fainting or loss of consciousness  · Rapid or very slow heart rate  · Loss of bowel or bladder control  When to seek medical advice  Call your healthcare provider right away if any of these occur:   · Pain becomes worse or spreads to your legs  · Weakness or numbness in one or both legs  · Numbness in the groin or genital area  Date Last Reviewed: 7/1/2016  © 8961-1624 [employer]. [address] [place] [id number]. All rights reserved. This information is not intended as a substitute for professional medical care. Always follow your healthcare professional's instructions.

## 2020-10-01 NOTE — PATIENT INSTRUCTIONS
PLEASE READ YOUR DISCHARGE INSTRUCTIONS ENTIRELY AS IT CONTAINS IMPORTANT INFORMATION.      Please drink plenty of fluids.  Please get plenty of rest.  Ice to the area.   You may do gently stretching if tolerable.    Please return here or go to the Emergency Department for any concerns or worsening of condition.    If you were prescribed a narcotic medication or muscle relaxer (robaxin), do not drive or operate heavy equipment or machinery while taking these medications. Take a half first to see how it affects you    Take the naproxen with food. Also take an over the counter antacid with it (prilosec, Nexium, Pepcid) to protect your stomach.     If you were not prescribed an anti-inflammatory medication, and if you do not have any history of stomach/intestinal ulcers, or kidney disease, or are not taking a blood thinner such as Coumadin, Plavix, Pradaxa, Eloquis, or Xaralta for example, it is OK to take over the counter Ibuprofen or Advil or Motrin or Aleve as directed.  Do not take these medications on an empty stomach.    If you lose control of your bowel and/or bladder, please go to the nearest Emergency Department immediately.    If you lose sensation in between your legs by your genitalia and/or rectum, please go to the nearest Emergency Department immediately.    If you lose control or sensation of any extremity, please go to the nearest Emergency Department immediately.    Do not stay in one position to long.  When sleeping on your back place a pillow under knees to reduce tension on back.  If sleeping on your side, place pillow between knees to keep spine in better alinement.  Wear supportive shoes such as tennis shoes for support of the lower back.  Take any medication as directed.    Please follow up with your primary care doctor or specialist as needed.    If you  smoke, please stop smoking.      Please arrange follow up with your primary medical clinic as soon as possible. You must understand that you've  received an Urgent Care treatment only and that you may be released before all of your medical problems are known or treated. You, the patient, will arrange for follow up as instructed. If your symptoms worsen or fail to improve you should go to the Emergency Room.      Back Pain (Acute or Chronic)    Back pain is one of the most common problems. The good news is that most people feel better in 1 to 2 weeks, and most of the rest in 1 to 2 months. Most people can remain active.  People experience and describe pain differently; not everyone is the same.  · The pain can be sharp, stabbing, shooting, aching, cramping or burning.  · Movement, standing, bending, lifting, sitting, or walking may worsen pain.  · It can be localized to one spot or area, or it can be more generalized.  · It can spread or radiate upwards, to the front, or go down your arms or legs (sciatica).  · It can cause muscle spasm.  Most of the time, mechanical problems with the muscles or spine cause the pain. Mechanical problems are usually caused by an injury to the muscles or ligaments. While illness can cause back pain, it is usually not caused by a serious illness. Mechanical problems include:   · Physical activity such as sports, exercise, work, or normal activity  · Overexertion, lifting, pushing, pulling incorrectly or too aggressively  · Sudden twisting, bending, or stretching from an accident, or accidental movement  · Poor posture  · Stretching or moving wrong, without noticing pain at the time  · Poor coordination, lack of regular exercise (check with your doctor about this)  · Spinal disc disease or arthritis  · Stress  Pain can also be related to pregnancy, or illness like appendicitis, bladder or kidney infections, pelvic infections, and many other things.  Acute back pain usually gets better in 1 to 2 weeks. Back pain related to disk disease, arthritis in the spinal joints or spinal stenosis (narrowing of the spinal canal) can become  chronic and last for months or years.  Unless you had a physical injury (for example, a car accident or fall) X-rays are usually not needed for the initial evaluation of back pain. If pain continues and does not respond to medical treatment, X-rays and other tests may be needed.  Home care  Try these home care recommendations:  · When in bed, try to find a position of comfort. A firm mattress is best. Try lying flat on your back with pillows under your knees. You can also try lying on your side with your knees bent up towards your chest and a pillow between your knees.  · At first, do not try to stretch out the sore spots. If there is a strain, it is not like the good soreness you get after exercising without an injury. In this case, stretching may make it worse.  · Avoid prolong sitting, long car rides, or travel. This puts more stress on the lower back than standing or walking.  · During the first 24 to 72 hours after an acute injury or flare up of chronic back pain, apply an ice pack to the painful area for 20 minutes and then remove it for 20 minutes. Do this over a period of 60 to 90 minutes or several times a day. This will reduce swelling and pain. Wrap the ice pack in a thin towel or plastic to protect your skin.  · You can start with ice, then switch to heat. Heat (hot shower, hot bath, or heating pad) reduces pain and works well for muscle spasms. Heat can be applied to the painful area for 20 minutes then remove it for 20 minutes. Do this over a period of 60 to 90 minutes or several times a day. Do not sleep on a heating pad. It can lead to skin burns or tissue damage.  · You can alternate ice and heat therapy. Talk with your doctor about the best treatment for your back pain.  · Therapeutic massage can help relax the back muscles without stretching them.  · Be aware of safe lifting methods and do not lift anything without stretching first.  Medicines  Talk to your doctor before using medicine, especially  if you have other medical problems or are taking other medicines.  · You may use over-the-counter medicine as directed on the bottle to control pain, unless another pain medicine was prescribed. If you have chronic conditions like diabetes, liver or kidney disease, stomach ulcers, or gastrointestinal bleeding, or are taking blood thinners, talk to your doctor before taking any medicine.  · Be careful if you are given a prescription medicines, narcotics, or medicine for muscle spasms. They can cause drowsiness, affect your coordination, reflexes, and judgement. Do not drive or operate heavy machinery.  Follow-up care  Follow up with your healthcare provider, or as advised.   A radiologist will review any X-rays that were taken. Your provide will notify you of any new findings that may affect your care.  Call 911  Call emergency services if any of the following occur:  · Trouble breathing  · Confusion  · Very drowsy or trouble awakening  · Fainting or loss of consciousness  · Rapid or very slow heart rate  · Loss of bowel or bladder control  When to seek medical advice  Call your healthcare provider right away if any of these occur:   · Pain becomes worse or spreads to your legs  · Weakness or numbness in one or both legs  · Numbness in the groin or genital area  Date Last Reviewed: 7/1/2016  © 5776-8088 [employer]. [address] [place] [address], [place] [address]. All rights reserved. This information is not intended as a substitute for professional medical care. Always follow your healthcare professional's instructions.

## 2020-10-01 NOTE — LETTER
October 1, 2020      [facility]  [address] [place] [address]  Phone: [phone]  Fax: [phone]       Patient: Hermilo Catherine   YOB: 1980  Date of Visit: 10/01/2020    To Whom It May Concern:    Valentine Catherine  was at [facility] on 10/01/2020. He may return to work/school on 10/02/2020 with no restrictions. If you have any questions or concerns, or if I can be of further assistance, please do not hesitate to contact me.    Sincerely,      Jose Alfredo Brown NP

## 2020-10-02 ENCOUNTER — TELEPHONE (OUTPATIENT)
Dept: URGENT CARE | Facility: CLINIC | Age: 40
End: 2020-10-02

## 2021-03-13 ENCOUNTER — HOSPITAL ENCOUNTER (EMERGENCY)
Facility: HOSPITAL | Age: 41
Discharge: HOME | End: 2021-03-13
Attending: EMERGENCY MEDICINE

## 2021-03-13 VITALS
TEMPERATURE: 99 F | OXYGEN SATURATION: 97 % | WEIGHT: 225 LBS | DIASTOLIC BLOOD PRESSURE: 101 MMHG | HEART RATE: 99 BPM | RESPIRATION RATE: 18 BRPM | SYSTOLIC BLOOD PRESSURE: 160 MMHG | BODY MASS INDEX: 31.5 KG/M2 | HEIGHT: 71 IN

## 2021-03-13 DIAGNOSIS — S39.012A STRAIN OF LUMBAR REGION, INITIAL ENCOUNTER: Primary | ICD-10-CM

## 2021-03-13 PROCEDURE — 25000003 PHARM REV CODE 250: Performed by: EMERGENCY MEDICINE

## 2021-03-13 PROCEDURE — 99284 EMERGENCY DEPT VISIT MOD MDM: CPT

## 2021-03-13 RX ORDER — IBUPROFEN 800 MG/1
800 TABLET ORAL EVERY 6 HOURS PRN
Qty: 15 TABLET | Refills: 0 | Status: SHIPPED | OUTPATIENT
Start: 2021-03-13 | End: 2022-01-02 | Stop reason: ALTCHOICE

## 2021-03-13 RX ORDER — METHOCARBAMOL 500 MG/1
1000 TABLET, FILM COATED ORAL 3 TIMES DAILY
Qty: 20 TABLET | Refills: 0 | Status: SHIPPED | OUTPATIENT
Start: 2021-03-13

## 2021-03-13 RX ORDER — METHOCARBAMOL 100 MG/ML
1000 INJECTION, SOLUTION INTRAMUSCULAR; INTRAVENOUS ONCE
Status: DISCONTINUED | OUTPATIENT
Start: 2021-03-13 | End: 2021-03-13

## 2021-03-13 RX ORDER — KETOROLAC TROMETHAMINE 30 MG/ML
15 INJECTION, SOLUTION INTRAMUSCULAR; INTRAVENOUS
Status: DISCONTINUED | OUTPATIENT
Start: 2021-03-13 | End: 2021-03-13

## 2021-03-13 RX ORDER — IBUPROFEN 400 MG/1
800 TABLET ORAL
Status: COMPLETED | OUTPATIENT
Start: 2021-03-13 | End: 2021-03-13

## 2021-03-13 RX ORDER — METHOCARBAMOL 500 MG/1
1000 TABLET, FILM COATED ORAL
Status: COMPLETED | OUTPATIENT
Start: 2021-03-13 | End: 2021-03-13

## 2021-03-13 RX ADMIN — IBUPROFEN 800 MG: 400 TABLET, FILM COATED ORAL at 07:03

## 2021-03-13 RX ADMIN — METHOCARBAMOL TABLETS 1000 MG: 500 TABLET, COATED ORAL at 07:03

## 2022-01-02 ENCOUNTER — HOSPITAL ENCOUNTER (EMERGENCY)
Facility: HOSPITAL | Age: 42
Discharge: HOME | End: 2022-01-02
Attending: EMERGENCY MEDICINE

## 2022-01-02 VITALS
SYSTOLIC BLOOD PRESSURE: 142 MMHG | RESPIRATION RATE: 18 BRPM | TEMPERATURE: 99 F | DIASTOLIC BLOOD PRESSURE: 106 MMHG | HEIGHT: 71 IN | BODY MASS INDEX: 31.38 KG/M2 | HEART RATE: 92 BPM | OXYGEN SATURATION: 99 %
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
## 2022-01-02 DIAGNOSIS — S50.02XA CONTUSION OF LEFT ELBOW, INITIAL ENCOUNTER: Primary | ICD-10-CM

## 2022-01-02 PROCEDURE — 99284 EMERGENCY DEPT VISIT MOD MDM: CPT | Mod: 25

## 2022-01-02 PROCEDURE — 25000003 PHARM REV CODE 250: Performed by: EMERGENCY MEDICINE

## 2022-01-02 RX ORDER — ACETAMINOPHEN AND CODEINE PHOSPHATE 300; 30 MG/1; MG/1
1 TABLET ORAL
Status: COMPLETED | OUTPATIENT
Start: 2022-01-02 | End: 2022-01-02

## 2022-01-02 RX ORDER — NAPROXEN 500 MG/1
500 TABLET ORAL 2 TIMES DAILY PRN
Qty: 30 TABLET | Refills: 0 | Status: SHIPPED | OUTPATIENT
Start: 2022-01-02

## 2022-01-02 RX ADMIN — ACETAMINOPHEN AND CODEINE PHOSPHATE 1 TABLET: 300; 30 TABLET ORAL at 09:01

## 2022-01-02 NOTE — DISCHARGE INSTRUCTIONS
Thank you for coming in to see us at [facility]! It was nice to meet you, and I hope you feel better soon. Please feel free to return to the ER at any time should your symptoms get worse, or if you have different emergent concerns.    Our goal in the emergency department is to always give you outstanding care and exceptional service. You may receive a survey by mail or e-mail in the next week regarding your experience in our ED. We would greatly appreciate your completing and returning the survey. Your feedback provides us with a way to recognize our staff who give very good care and it helps us learn how to improve when your experience was below our aspiration of excellence.       Sincerely,    Red Diane MD  Medical Director  Emergency Department  [facility]

## 2022-01-02 NOTE — ED PROVIDER NOTES
Encounter Date: 1/2/2022       History     Chief Complaint   Patient presents with    Fall     Pt states he slipped on water this am, pain and swelling noted to forearm and elbow, + left shoulder pain and lower back pain, +2 radial pulse noted      HPI   This is a 41 y.o. male who has no past medical history on file.     The patient presents to the Emergency Department with slip and fall this morning when he slipped on water, falling on his left elbow.  Pain is radiating up to his left shoulder and down into his left forearm.  Denies any head injury, neck injury.  Denies any numbness or weakness to his hand.  Symptoms worse with movement and palpation of the left elbow.  No prior history of similar issue past.     Review of patient's allergies indicates:  No Known Allergies  No past medical history on file.  No past surgical history on file.  No family history on file.  Social History     Tobacco Use    Smoking status: Current Every Day Smoker     Packs/day: 0.50     Types: Cigarettes     Last attempt to quit: 3/2/2018     Years since quitting: 3.8    Smokeless tobacco: Never Used   Substance Use Topics    Alcohol use: No    Drug use: No     Review of Systems   Constitutional: Positive for activity change.   Musculoskeletal: Positive for arthralgias. Negative for neck pain.   Skin: Negative for wound.   Neurological: Negative for weakness and numbness.       Physical Exam     Initial Vitals [01/02/22 0854]   BP Pulse Resp Temp SpO2   (!) 159/62 102 16 98.8 °F (37.1 °C) 99 %      MAP       --         Physical Exam    Nursing note and vitals reviewed.  Constitutional: He appears well-developed and well-nourished. He is not diaphoretic. No distress.   HENT:   Head: Normocephalic and atraumatic.   Mouth/Throat: Oropharynx is clear and moist.   Eyes: Conjunctivae are normal.   Cardiovascular: Normal rate and regular rhythm.   Pulmonary/Chest: No respiratory distress.   Musculoskeletal:         General: Tenderness  present.      Left shoulder: Tenderness (anterior deltoid and suprascapular) present. No swelling, deformity, effusion or bony tenderness. Normal range of motion.      Left elbow: Swelling present. No deformity or effusion. Decreased range of motion. Tenderness present in lateral epicondyle and olecranon process. No radial head or medial epicondyle tenderness.      Left forearm: Swelling, tenderness and bony tenderness present.     Neurological: He is alert and oriented to person, place, and time.   Skin: Skin is warm and dry. Capillary refill takes less than 2 seconds. No rash noted. No pallor.   Psychiatric: He has a normal mood and affect.         ED Course   Procedures  Labs Reviewed - No data to display       Imaging Results          X-Ray Forearm Left (Final result)  Result time 01/02/22 11:46:09    Final result by Atilio Reinoso MD (01/02/22 11:46:09)                 Impression:      No convincing evidence of acute fracture or dislocation.      Electronically signed by: Atilio Reinoso  Date:    01/02/2022  Time:    11:46             Narrative:    EXAMINATION:  XR FOREARM LEFT    CLINICAL HISTORY:  Unspecified fall, initial encounter    TECHNIQUE:  AP and lateral views of the left forearm were performed.    COMPARISON:  None    FINDINGS:  No definite evidence of acute fracture or dislocation.  Joint spaces appear maintained.  No definite evidence of radiopaque foreign body is seen.                               X-Ray Elbow Complete Left (Final result)  Result time 01/02/22 10:02:13    Final result by Carlo Champion MD (01/02/22 10:02:13)                 Impression:      No acute abnormality.      Electronically signed by: Carlo Champion MD  Date:    01/02/2022  Time:    10:02             Narrative:    EXAMINATION:  XR ELBOW COMPLETE 3 VIEW LEFT    CLINICAL HISTORY:  Unspecified fall, initial encounter    TECHNIQUE:  AP, lateral, and oblique views of the left elbow were  performed.    COMPARISON:  None    FINDINGS:  No displaced fracture or subluxation.  No suspicious bone lesion.    Unremarkable soft tissues.                                 Medications   acetaminophen-codeine 300-30mg per tablet 1 tablet (1 tablet Oral Given 1/2/22 0942)     Medical Decision Making:   Initial Assessment:   This is an emergent evaluation of a 41 y.o.male patient with presentation of fall on to LUE, specifically having pain in the left elbow with radiation to the shoulder and forearm.  Patient has bony tenderness to left elbow specifically the olecranon and lateral epicondyle.  X-rays of the left elbow were unremarkable for acute fracture or subluxation.  Patient also had some tenderness to the proximal left forearm, x-rays failed to show any bony abnormality.  Overall impression is left elbow contusion s/p fall.  RICE, NSAIDs and f/u with PCP. Sling not recommended at this time.    Clinical Tests:   Radiological Study: Ordered and Reviewed                      Clinical Impression:   Final diagnoses:  [W19.XXXA] Fall  [S50.02XA] Contusion of left elbow, initial encounter (Primary)          ED Disposition Condition    Discharge Stable        ED Prescriptions     Medication Sig Dispense Start Date End Date Auth. Provider    naproxen (NAPROSYN) 500 MG tablet Take 1 tablet (500 mg total) by mouth 2 (two) times daily as needed (pain). 30 tablet 1/2/2022  Red Diane MD        Follow-up Information     Follow up With Specialties Details Why Contact Info    Beny Diaz MD Family Medicine   [address] [place] [id number]  [phone]             Red Diane MD  01/02/22 7168

## 2022-01-02 NOTE — ED TRIAGE NOTES
Pt presents to ED today c/o left arm pain secondary to slip and near fall. Pt reports slipping in water at mother's house and bracing himself from fall with left arm, hitting kitchen cabinet. Denies head trauma. Pt c/o left shoulder, elbow, and forearm pain. + radial pulse

## 2022-01-02 NOTE — Clinical Note
"Hermilo Mariealessia Catherine was seen and treated in our emergency department on 1/2/2022.  He may return with limitations on 01/03/2022.  Non-weight bearing Left arm for the next 5 days     Sincerely,      eRd Diane MD    "

## 2022-01-18 ENCOUNTER — HOSPITAL ENCOUNTER (EMERGENCY)
Facility: HOSPITAL | Age: 42
Discharge: HOME | End: 2022-01-18
Attending: EMERGENCY MEDICINE

## 2022-01-18 VITALS
TEMPERATURE: 98 F | RESPIRATION RATE: 16 BRPM | HEIGHT: 71 IN | SYSTOLIC BLOOD PRESSURE: 136 MMHG | WEIGHT: 223.75 LBS | HEART RATE: 72 BPM | OXYGEN SATURATION: 99 % | BODY MASS INDEX: 31.32 KG/M2 | DIASTOLIC BLOOD PRESSURE: 89 MMHG

## 2022-01-18 DIAGNOSIS — S69.92XA INJURY OF LEFT WRIST, INITIAL ENCOUNTER: Primary | ICD-10-CM

## 2022-01-18 DIAGNOSIS — M25.532 LEFT WRIST PAIN: ICD-10-CM

## 2022-01-18 PROCEDURE — 99283 EMERGENCY DEPT VISIT LOW MDM: CPT | Mod: ,,, | Performed by: EMERGENCY MEDICINE

## 2022-01-18 PROCEDURE — 99283 EMERGENCY DEPT VISIT LOW MDM: CPT

## 2022-01-18 PROCEDURE — 99283 PR EMERGENCY DEPT VISIT,LEVEL III: ICD-10-PCS | Mod: ,,, | Performed by: EMERGENCY MEDICINE

## 2022-01-18 PROCEDURE — 25000003 PHARM REV CODE 250: Performed by: EMERGENCY MEDICINE

## 2022-01-18 RX ORDER — IBUPROFEN 600 MG/1
600 TABLET ORAL
Status: COMPLETED | OUTPATIENT
Start: 2022-01-18 | End: 2022-01-18

## 2022-01-18 RX ADMIN — IBUPROFEN 600 MG: 600 TABLET, FILM COATED ORAL at 08:01

## 2022-01-18 NOTE — ED TRIAGE NOTES
Hermilo Catherine, a 41 y.o. male presents to the ED w/ complaint of left wrist injury happening around 0630 after a wrench fell on his wrist at work. Pain and decreased ROM to wrist but radial pulses strong and full ROM to fingers.     Triage note:  Chief Complaint   Patient presents with    Wrist Injury     LOC: The patient is awake, alert, and oriented to self, place, time, and situation. Pt is calm and cooperative. Affect is appropriate.  Speech is appropriate and clear.     APPEARANCE: Patient resting comfortably in no acute distress.  Patient is clean and well groomed.    SKIN: The skin is warm and dry; color consistent with ethnicity.  Patient has normal skin turgor and moist mucus membranes.  Skin intact; no breakdown or bruising noted.     MUSCULOSKELETAL: Patient moving upper and lower extremities without difficulty; denies pain in the extremities or back.  Denies weakness. Stable gait.     RESPIRATORY: Airway is open and patent. Respirations spontaneous, even, easy, and non-labored.  Patient has a normal effort and rate.  No accessory muscle use noted. Denies cough and SOB.     CARDIAC:  Normal rate noted.  No peripheral edema noted. No complaints of chest pain.     ABDOMEN: Soft and non tender to palpation.  No distention noted. Pt denies abdominal pain; denies nausea, vomiting, diarrhea, or constipation.    NEUROLOGIC: Eyes open spontaneously.  Behavior appropriate to situation.  Follows commands; facial expression symmetrical.  Purposeful motor response noted; normal sensation in all extremities. Pt denies headache; denies lightheadedness or dizziness; denies visual disturbances; denies loss of balance; denies unilateral weakness.

## 2022-01-18 NOTE — DISCHARGE INSTRUCTIONS
Rest, ice, elevate rest.  Motrin as needed for pain.  Wear brace when working.  Avoid heavy lifting for the next week

## 2022-01-18 NOTE — Clinical Note
"Hermilo Burgos" Florin was seen and treated in our emergency department on 1/18/2022.  He may return with limitations on 01/19/2022.  No heavy lifting x 1 week       Sincerely,      Toshia Candelario MD    "

## 2022-01-18 NOTE — ED PROVIDER NOTES
Encounter Date: 1/18/2022       History     Chief Complaint   Patient presents with    Wrist Injury     José Luis Catherine is a 41 M no past medical history presenting today with left wrist pain.  Patient states he was at work when a tool box fell on his left wrist.  He had immediate pain, 3/10, worse with movement.  No significant swelling.  Denies numbness/tingling.  Still able to hand .  Denies any other injuries.        Review of patient's allergies indicates:  No Known Allergies  No past medical history on file.  No past surgical history on file.  No family history on file.  Social History     Tobacco Use    Smoking status: Current Every Day Smoker     Packs/day: 0.50     Types: Cigarettes     Last attempt to quit: 3/2/2018     Years since quitting: 3.8    Smokeless tobacco: Never Used   Substance Use Topics    Alcohol use: No    Drug use: No     Review of Systems   Musculoskeletal: Positive for arthralgias (left wrist). Negative for joint swelling.   Skin: Negative for color change, rash and wound.   Neurological: Negative for weakness and numbness.       Physical Exam     Initial Vitals [01/18/22 0824]   BP Pulse Resp Temp SpO2   (!) 153/105 72 18 98 °F (36.7 °C) 99 %      MAP       --         Physical Exam    Nursing note and vitals reviewed.  Constitutional: He appears well-developed. No distress.   Pulmonary/Chest: No respiratory distress.   Musculoskeletal:      Left wrist: Bony tenderness (tenderness over the medial aspect of the wrist) present. No swelling, deformity, tenderness or snuff box tenderness.      Comments: 2 + DP pulse     Neurological: He is alert and oriented to person, place, and time.   No sensory deficit.  Good hand , no weakness noted   Skin:   No ecchymosis of the left wrist           ED Course   Procedures  Labs Reviewed - No data to display       Imaging Results          X-Ray Wrist Complete Left (Final result)  Result time 01/18/22 08:57:36    Final result by José Luis YI  MD Mynor (01/18/22 08:57:36)                 Impression:      Normal findings      Electronically signed by: José Luis Lowe MD  Date:    01/18/2022  Time:    08:57             Narrative:    EXAMINATION:  XR WRIST COMPLETE 3 VIEWS LEFT    CLINICAL HISTORY:  Pain in left wrist    TECHNIQUE:  PA, lateral, and oblique views of the left wrist were performed.    COMPARISON:  None    FINDINGS:  Bony structures of the wrist are intact.  No fracture or dislocation can be seen.                                 Medications   ibuprofen tablet 600 mg (600 mg Oral Given 1/18/22 0843)     Medical Decision Making:   History:   Old Medical Records: I decided to obtain old medical records.  Initial Assessment:   Urgent evaluation 41-year-old male presenting with left wrist pain, injured at work.    He is hypertensive otherwise vital signs are stable.   Differential Diagnosis:   Left wrist contusion, sprain, strain, low suspicion for fracture  Independently Interpreted Test(s):   I have ordered and independently interpreted X-rays - see prior notes.  Clinical Tests:   Radiological Study: Reviewed and Ordered  ED Management:  - motrin  - Xray wrist             ED Course as of 01/18/22 0907   Tue Jan 18, 2022   0907 X-ray reviewed, no acute fracture or dislocation.    Recommend rest, ice, elevate.  No heavy lifting for 1 week.  Will discharge with removable splint for support. [GM]      ED Course User Index  [GM] Toshia Candelario MD             Clinical Impression:   Final diagnoses:  [M25.532] Left wrist pain  [S69.92XA] Injury of left wrist, initial encounter (Primary)          ED Disposition Condition    Discharge Stable        ED Prescriptions     None        Follow-up Information     Follow up With Specialties Details Why Contact Info    Beny Diaz MD Family Medicine  If symptoms worsen [id number] [facility] [id number]  [phone]             Toshia Candelario MD  01/18/22 0907

## 2023-03-12 ENCOUNTER — HOSPITAL ENCOUNTER (EMERGENCY)
Facility: HOSPITAL | Age: 43
Discharge: HOME | End: 2023-03-12
Attending: EMERGENCY MEDICINE

## 2023-03-12 VITALS
HEART RATE: 84 BPM | DIASTOLIC BLOOD PRESSURE: 90 MMHG | OXYGEN SATURATION: 98 % | BODY MASS INDEX: 24.55 KG/M2 | TEMPERATURE: 98 F | RESPIRATION RATE: 18 BRPM | WEIGHT: 176.06 LBS | SYSTOLIC BLOOD PRESSURE: 151 MMHG
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
## 2023-03-12 PROCEDURE — 99281 EMR DPT VST MAYX REQ PHY/QHP: CPT

## 2023-03-12 NOTE — Clinical Note
"Hermilo Burgos" Florin was seen and treated in our emergency department on 3/12/2023.  He may return to work on 03/13/2023.       If you have any questions or concerns, please don't hesitate to call.      Moy Calabrese MD"

## 2023-03-13 NOTE — FIRST PROVIDER EVALUATION
Emergency Department TeleTriage Encounter Note      CHIEF COMPLAINT    Chief Complaint   Patient presents with    Fall     Patient states he was horse playing with a co worker resulting in a fall out of a truck at work. Landed on right side. Employer required patient to come to ER for evaluation. Patient has zero pain.        VITAL SIGNS   Initial Vitals [03/12/23 1919]   BP Pulse Resp Temp SpO2   (!) 151/90 84 18 98.2 °F (36.8 °C) 98 %      MAP       --            ALLERGIES    Review of patient's allergies indicates:  No Known Allergies    PROVIDER TRIAGE NOTE  Patient had accidental fall from a truck landing on right shoulder. He denies any pain in the shoulder or other injuries. States employer required him to come to the ED for evaluation.       ORDERS  Labs Reviewed - No data to display    ED Orders (720h ago, onward)      None              Virtual Visit Note: The provider triage portion of this emergency department evaluation and documentation was performed via [employer], a HIPAA-compliant telemedicine application, in concert with a tele-presenter in the room. A face to face patient evaluation with one of my colleagues will occur once the patient is placed in an emergency department room.      DISCLAIMER: This note was prepared with [employer] voice recognition transcription software. Garbled syntax, mangled pronouns, and other bizarre constructions may be attributed to that software system.

## 2023-03-13 NOTE — ED NOTES
Patient without complaint.  States he fell at work and was required to present for evaluation.     Pain:  Rated 0/10.     Psychosocial:  Patient is calm and cooperative.  Patients insight and judgement are appropriate to situation.  Appears clean, well maintained, with clothing appropriate to environment.  No evidence of delusions, hallucinations, or psychosis.     Neuro:  Eyes open spontaneously.  Awake, alert, oriented x 4.  Speech clear and appropriate.  Tolerating saliva secretions well.  Able to follow commands, demonstrating ability to actively and appropriately communicate within context of current conversation.  Symmetrical facial muscles.  Moving all extremities well with no noted weakness.  Adequate muscle tone present.    Movement is purposeful.        Airway:  Bilateral chest rise and fall.  RR regular and non-labored.       Circulatory:  Skin warm, dry, and pink.        Extremities:  No redness, heat, swelling, deformity, or pain.     Skin:  Intact with no bruising/discolorations noted.     cerumen impacted

## 2023-03-13 NOTE — ED PROVIDER NOTES
Encounter Date: 3/12/2023       History     Chief Complaint   Patient presents with    Fall     Patient states he was horse playing with a co worker resulting in a fall out of a truck at work. Landed on right side. Employer required patient to come to ER for evaluation. Patient has zero pain.      HPI    Pleasant 42-year-old male denies medical history presents the ER for evaluation of left shoulder pain after fall.  Patient reports that he was playing with a co-worker at work and fell out of a truck onto his left shoulder.  Did not hit to head not lose of consciousness he has no complaints, however his work informed him to come the ER for medical clearance.      Review of patient's allergies indicates:  No Known Allergies  No past medical history on file.  No past surgical history on file.  No family history on file.  Social History     Tobacco Use    Smoking status: Every Day     Packs/day: 0.50     Types: Cigarettes     Last attempt to quit: 3/2/2018     Years since quittin.0    Smokeless tobacco: Never   Substance Use Topics    Alcohol use: No    Drug use: No     Review of Systems   Musculoskeletal:  Negative for arthralgias, back pain, myalgias and neck pain.   All other systems reviewed and are negative.    Physical Exam     Initial Vitals [23 1919]   BP Pulse Resp Temp SpO2   (!) 151/90 84 18 98.2 °F (36.8 °C) 98 %      MAP       --         Physical Exam    Nursing note and vitals reviewed.  Constitutional: He appears well-developed and well-nourished.   HENT:   Head: Normocephalic and atraumatic.   Eyes: EOM are normal. Pupils are equal, round, and reactive to light.   Neck:   Normal range of motion.  Pulmonary/Chest: No respiratory distress.   Musculoskeletal:         General: No tenderness or edema. Normal range of motion.      Cervical back: Normal range of motion.     Neurological: He is alert and oriented to person, place, and time. He has normal strength. GCS score is 15. GCS eye subscore is  "4. GCS verbal subscore is 5. GCS motor subscore is 6.   Skin: Skin is warm and dry. Capillary refill takes less than 2 seconds.   Psychiatric: He has a normal mood and affect. Thought content normal.       ED Course   Procedures  Labs Reviewed - No data to display       Imaging Results    None          Medications - No data to display  Medical Decision Making:   Initial Assessment:   Pleasant 42-year-old male no medical problems presents the ER for evaluation medical clearance after incident at work.  He fell from a truck on his left shoulder.  Patient reports he was "horse playing" with his friend he has no injury no complaints no hit to head no loss of consciousness.  He is ambulating without difficulty with appropriate range of motion all his extremities.  He has no reproducible tenderness or deformity on exam patient has no complaints.  Patient does not want any further investigations.  I believe patient did not sustain major injury based on physical exam and lack of complaints per patient.  Patient is medically cleared to return back to work tomorrow, I expect he may be sore tomorrow, we discussed symptomatic support as needed return precautions.  Plan discharge home.                        Clinical Impression:   Final diagnoses:  [W19.XXXA] Fall, initial encounter (Primary)        ED Disposition Condition    Discharge Stable          ED Prescriptions    None       Follow-up Information       Follow up With Specialties Details Why Contact Info    Beny Diaz MD Family Medicine Schedule an appointment as soon as possible for a visit  As needed [address] [facility] [id number]  [phone]               Moy Calabrese MD  03/13/23 0530    "

## 2024-07-29 ENCOUNTER — HOSPITAL ENCOUNTER (EMERGENCY)
Facility: HOSPITAL | Age: 44
Discharge: HOME | End: 2024-07-29
Attending: STUDENT IN AN ORGANIZED HEALTH CARE EDUCATION/TRAINING PROGRAM

## 2024-07-29 VITALS
DIASTOLIC BLOOD PRESSURE: 90 MMHG | RESPIRATION RATE: 18 BRPM | TEMPERATURE: 98 F | WEIGHT: 225 LBS | HEIGHT: 71 IN | SYSTOLIC BLOOD PRESSURE: 138 MMHG | OXYGEN SATURATION: 100 % | HEART RATE: 90 BPM | BODY MASS INDEX: 31.5 KG/M2

## 2024-07-29 DIAGNOSIS — M54.41 ACUTE BACK PAIN WITH SCIATICA, RIGHT: Primary | ICD-10-CM

## 2024-07-29 PROCEDURE — 25000003 PHARM REV CODE 250

## 2024-07-29 PROCEDURE — 63600175 PHARM REV CODE 636 W HCPCS

## 2024-07-29 PROCEDURE — 99284 EMERGENCY DEPT VISIT MOD MDM: CPT

## 2024-07-29 RX ORDER — METHOCARBAMOL 500 MG/1
500 TABLET, FILM COATED ORAL 3 TIMES DAILY
Qty: 9 TABLET | Refills: 0 | Status: SHIPPED | OUTPATIENT
Start: 2024-07-29 | End: 2024-08-01

## 2024-07-29 RX ORDER — DICLOFENAC SODIUM 10 MG/G
2 GEL TOPICAL 4 TIMES DAILY
Qty: 20 G | Refills: 0 | Status: SHIPPED | OUTPATIENT
Start: 2024-07-29

## 2024-07-29 RX ORDER — METHOCARBAMOL 500 MG/1
500 TABLET, FILM COATED ORAL
Status: COMPLETED | OUTPATIENT
Start: 2024-07-29 | End: 2024-07-29

## 2024-07-29 RX ORDER — LIDOCAINE 50 MG/G
1 PATCH TOPICAL DAILY
Qty: 5 PATCH | Refills: 0 | Status: SHIPPED | OUTPATIENT
Start: 2024-07-29 | End: 2024-08-03

## 2024-07-29 RX ORDER — ACETAMINOPHEN 500 MG
1000 TABLET ORAL
Status: COMPLETED | OUTPATIENT
Start: 2024-07-29 | End: 2024-07-29

## 2024-07-29 RX ORDER — KETOROLAC TROMETHAMINE 10 MG/1
10 TABLET, FILM COATED ORAL
Status: COMPLETED | OUTPATIENT
Start: 2024-07-29 | End: 2024-07-29

## 2024-07-29 RX ORDER — PREDNISONE 20 MG/1
40 TABLET ORAL
Status: COMPLETED | OUTPATIENT
Start: 2024-07-29 | End: 2024-07-29

## 2024-07-29 RX ORDER — IBUPROFEN 400 MG/1
800 TABLET ORAL
Status: DISCONTINUED | OUTPATIENT
Start: 2024-07-29 | End: 2024-07-29

## 2024-07-29 RX ADMIN — ACETAMINOPHEN 1000 MG: 500 TABLET ORAL at 09:07

## 2024-07-29 RX ADMIN — PREDNISONE 40 MG: 20 TABLET ORAL at 09:07

## 2024-07-29 RX ADMIN — METHOCARBAMOL 500 MG: 500 TABLET ORAL at 09:07

## 2024-07-29 RX ADMIN — KETOROLAC TROMETHAMINE 10 MG: 10 TABLET, FILM COATED ORAL at 09:07

## 2024-07-29 NOTE — Clinical Note
"Hermilo Mariealessia Catherine was seen and treated in our emergency department on 7/29/2024.  He may return to work on 07/31/2024.       If you have any questions or concerns, please don't hesitate to call.      Sonia Mills PA-C"

## 2024-07-30 NOTE — ED PROVIDER NOTES
Encounter Date: 2024       History     Chief Complaint   Patient presents with    Leg Pain     Pt c/o non traumatic right leg/hip pain that started Saturday. Pt denies taking any meds for the pain. Pt reports he only has pain when he puts pressure on that leg     Patient is a 44 y.o. male who presents for evaluation of lumbar back pain that radiates down the back of the right leg x 2 days. Says it began after he fell asleep on the ground. Believes he may have slept in an unusual position. Denies any other known injuries.  Patient does not have a hx of back pain. Patient has taken ibuprofen earlier today for symptom relief.  There is no numbness, weakness, incontinence, or retention reported. Denies hx of IVDU or malignancy. Denies fever, headache, chest pain, shortness of breath, wheezing, stridor, drooling, NVD, abdominal pain, constipation, urinary problems, joint problems, rashes, or any other complaints at this time.      The history is provided by the patient.     Review of patient's allergies indicates:  No Known Allergies  No past medical history on file.  No past surgical history on file.  No family history on file.  Social History     Tobacco Use    Smoking status: Every Day     Current packs/day: 0.00     Types: Cigarettes     Last attempt to quit: 3/2/2018     Years since quittin.4    Smokeless tobacco: Never   Substance Use Topics    Alcohol use: No    Drug use: No     Review of Systems   Constitutional:  Negative for chills and fever.   Genitourinary: Negative.    Musculoskeletal:  Positive for back pain.   Skin:  Negative for pallor and rash.       Physical Exam     Initial Vitals [24 2132]   BP Pulse Resp Temp SpO2   (!) 138/90 90 18 97.8 °F (36.6 °C) 100 %      MAP       --         Physical Exam    Constitutional: He appears well-developed and well-nourished.   HENT:   Head: Normocephalic and atraumatic.   Cardiovascular:  Normal rate.           Musculoskeletal:      Comments:  right-sided paraspinal tenderness in the lumbar area that radiated posterior right leg. No midline tenderness, bony step-offs, deformities noted to C, T, L-spine.  Negative straight leg raise, bilaterally. Neurovascularly intact.  No focal weakness. BUE and BLE strength normal - normal strength with hip flexion, knee flexion and extension, plantarflexion and dorsiflexion, and dorsiflexion of the great toe. 2+ pedal pulse. No CVA tenderness.      Neurological: He is alert.         ED Course   Procedures  Labs Reviewed - No data to display       Imaging Results    None          Medications   acetaminophen tablet 1,000 mg (1,000 mg Oral Given 7/29/24 2147)   methocarbamoL tablet 500 mg (500 mg Oral Given 7/29/24 2146)   predniSONE tablet 40 mg (40 mg Oral Given 7/29/24 2146)   ketorolac tablet 10 mg (10 mg Oral Given 7/29/24 2148)     Medical Decision Making  Patient is a afebrile, well appearing 44 y.o. male who presents for evaluation of lower back pain. Patient is able to ambulate. Denies saddle anesthesia, loss of bowel or bladder control, or urinary retention. Pulses normal. BLE strength normal. Neurovascularly intact.     Differential Diagnosis includes, but is not limited to: Cauda equina syndrome, disc herniation, spinal stenosis, sciatica, radiculopathy, lumbar muscle strain     All historical, clinical findings were reviewed with patient. At present, the patient's mechanism of injury as well as the location leans heavily towards lumbar strain. Clinical picture consistent with benign musculoskeletal back pain without any red flags to indicate need for emergent imaging.  There are no findings worrisome for neurologic deficit or infection. No fevers/trauma/bowel/bladder dysfunction/leg weakness/hx of cancer or IVDA, exam w/o evidence to suggest cord compression, cauda equina or retroperitoneal process. Pain improved with robaxin, acetaminophen, ibuprofen.     No further intervention is indicated at this time and  I am of the belief that that it is safe to discharge the patient from the emergency department. Patient has been counseled regarding the need for follow-up as well as the indications to return to the emergency room should new or worrisome developments (including,but not limited to: worsening pain, saddle anesthesia, loss of bowel or bladder control, loss of strength or sensation) occur. Referral placed to PM&R. Additionally, patient instructed to follow up with PCP in 2-3 days for recheck of today's complaints.    Discharge and follow-up instructions discussed with the patient who expressed understanding and willingness to comply with recommendations. Patient discharged from the emergency department in stable condition, in no acute distress.     Risk  OTC drugs.  Prescription drug management.                                      Clinical Impression:  Final diagnoses:  [M54.41] Acute back pain with sciatica, right (Primary)          ED Disposition Condition    Discharge Stable          ED Prescriptions       Medication Sig Dispense Start Date End Date Auth. Provider    methocarbamoL (ROBAXIN) 500 MG Tab Take 1 tablet (500 mg total) by mouth 3 (three) times daily. for 3 days 9 tablet 7/29/2024 8/1/2024 Sonia Mills PA-C    diclofenac sodium (VOLTAREN) 1 % Gel Apply 2 g topically 4 (four) times daily. 20 g 7/29/2024 -- Sonia Mills PA-C    LIDOcaine (LIDODERM) 5 % Place 1 patch onto the skin once daily. Remove & Discard patch within 12 hours or as directed by MD for 5 days 5 patch 7/29/2024 8/3/2024 Sonia Mills PA-C          Follow-up Information    None          Sonia Mills PA-C  07/31/24 [id number]

## 2024-07-30 NOTE — DISCHARGE INSTRUCTIONS
Thank you for letting me care for you today - it was nice to meet you and I hope you feel better soon. Please return to the ER if your symptoms don't improve or get worse. And be sure to follow up with your primary care provider within the next week. I have also placed a referral to Physical Medicine and Rehabilitation for follow up care. [facility] will call you within 48 hours to make an appointment, or you can call [phone] to schedule.     Medication:   Rotate between Tylenol and ibuprofen (Motrin), switching every 3 hours. For example, Tylenol at 8am, ibuprofen at 11am, tylenol at 2pm.  Do not take more than 3000 mg of Tylenol in 1 day or more than 2400 mg of ibuprofen and 1 day.     I have sent in prescriptions for the following:   - Voltaren Gel: You can rub this on the area that is causing you pain 4 times per day.    - I have prescribed a muscle relaxer, Robaxin. You can take this 3 times per day, but it can make you sleepy. So I recommend only taking this at night. Please do note drive or operate heavy machinery while taking.    - Lidocaine patches: leave on for 12 hours, take off for 12 hours. Then apply a new patch.    Our goal at [facility] is to always give you outstanding care and exceptional service. You may receive a survey by mail or email in the next week about your experience in our ED. We would greatly appreciate you completing and returning the survey. Your feedback provides us with a way to recognize our staff who give very good care and it helps us learn how to improve when your experience was below our aspiration of excellence.     All the best,     Sonia Mills, MPH, PA-C  Emergency Department Physician Assistant  [facility], [facility]

## 2024-08-04 ENCOUNTER — HOSPITAL ENCOUNTER (EMERGENCY)
Facility: HOSPITAL | Age: 44
Discharge: HOME | End: 2024-08-04
Attending: EMERGENCY MEDICINE

## 2024-08-04 VITALS
HEIGHT: 71 IN | DIASTOLIC BLOOD PRESSURE: 89 MMHG | WEIGHT: 225 LBS | RESPIRATION RATE: 18 BRPM | OXYGEN SATURATION: 99 % | SYSTOLIC BLOOD PRESSURE: 160 MMHG | HEART RATE: 89 BPM | BODY MASS INDEX: 31.5 KG/M2 | TEMPERATURE: 98 F

## 2024-08-04 DIAGNOSIS — L23.9 ALLERGIC CONTACT DERMATITIS, UNSPECIFIED TRIGGER: Primary | ICD-10-CM

## 2024-08-04 PROCEDURE — 99283 EMERGENCY DEPT VISIT LOW MDM: CPT

## 2024-08-04 RX ORDER — TRIAMCINOLONE ACETONIDE 1 MG/G
CREAM TOPICAL 2 TIMES DAILY
Qty: 105 G | Refills: 0 | Status: SHIPPED | OUTPATIENT
Start: 2024-08-04

## 2024-10-14 ENCOUNTER — OCCUPATIONAL HEALTH (OUTPATIENT)
Dept: URGENT CARE | Facility: CLINIC | Age: 44
End: 2024-10-14

## 2024-10-14 VITALS — HEIGHT: 71 IN | BODY MASS INDEX: 28.28 KG/M2 | WEIGHT: 202 LBS

## 2024-10-14 DIAGNOSIS — Z02.1 PRE-EMPLOYMENT EXAMINATION: Primary | ICD-10-CM

## 2025-03-11 ENCOUNTER — HOSPITAL ENCOUNTER (EMERGENCY)
Facility: HOSPITAL | Age: 45
Discharge: HOME | End: 2025-03-11
Attending: STUDENT IN AN ORGANIZED HEALTH CARE EDUCATION/TRAINING PROGRAM
Payer: COMMERCIAL

## 2025-03-11 VITALS
SYSTOLIC BLOOD PRESSURE: 142 MMHG | OXYGEN SATURATION: 99 % | HEIGHT: 71 IN | HEART RATE: 97 BPM | WEIGHT: 206 LBS | TEMPERATURE: 98 F | BODY MASS INDEX: 28.84 KG/M2 | RESPIRATION RATE: 18 BRPM | DIASTOLIC BLOOD PRESSURE: 90 MMHG

## 2025-03-11 DIAGNOSIS — S39.012A STRAIN OF LUMBAR REGION, INITIAL ENCOUNTER: Primary | ICD-10-CM

## 2025-03-11 PROCEDURE — 99283 EMERGENCY DEPT VISIT LOW MDM: CPT

## 2025-03-11 RX ORDER — IBUPROFEN 600 MG/1
600 TABLET ORAL EVERY 6 HOURS PRN
Qty: 20 TABLET | Refills: 0 | Status: SHIPPED | OUTPATIENT
Start: 2025-03-11

## 2025-03-11 NOTE — Clinical Note
"Hermilo Burgos" Catherine was seen and treated in our emergency department on 3/11/2025.  He may return with no restrictions on 03/12/2025.       Sincerely,      Nora Youssef,     "

## 2025-03-12 NOTE — ED TRIAGE NOTES
Pt reports back pain after lifting something heavy at approx 1700. He was sent home and told to be evaluated before he came back to work. He states the pain felt like a sharp and tingling pain to the lower back. He took a motrin with total relief.

## 2025-03-12 NOTE — DISCHARGE INSTRUCTIONS
These medications are available over the counter to help with musculoskeletal pain:      You can take tylenol 500 mg every 6 hours.   You can use over the counter Salonpas LIDOCAINE 4% Pain Relieving Gel-Patch or something similar that contains lidocaine.

## 2025-03-12 NOTE — FIRST PROVIDER EVALUATION
Emergency Department TeleTriage Encounter Note      CHIEF COMPLAINT    Chief Complaint   Patient presents with    Back Pain     Patient reports lifting something heavy at work which resulted in lower back pain. Patient states the pain is mostly resolved but his work wants him checked out. Denies other acute symptoms.        VITAL SIGNS   Initial Vitals [03/11/25 2028]   BP Pulse Resp Temp SpO2   (!) 142/90 97 18 98.3 °F (36.8 °C) 99 %      MAP       --            ALLERGIES    Review of patient's allergies indicates:  No Known Allergies    PROVIDER TRIAGE NOTE  44 year old male presents to the ER with complaints of new onset right lower back pain that began today while lifting heavy object while at work today. Reports no paresthesias, weakness or bowel or bladder incontinence. Pain has overall improved since initial injury.    AAOx3, respirations even and non- labored, stable vitals, normal coloration of skin, sitting upright in triage chair, appears in no acute distress.          ORDERS  Labs Reviewed - No data to display    ED Orders (720h ago, onward)      None              Virtual Visit Note: The provider triage portion of this emergency department evaluation and documentation was performed via [employer], a HIPAA-compliant telemedicine application, in concert with a tele-presenter in the room. A face to face patient evaluation with one of my colleagues will occur once the patient is placed in an emergency department room.      DISCLAIMER: This note was prepared with [employer] voice recognition transcription software. Garbled syntax, mangled pronouns, and other bizarre constructions may be attributed to that software system.

## 2025-03-12 NOTE — ED PROVIDER NOTES
NAME:  Hermilo Catherine  CSN:     [id number]  MRN:    [id number]  ADMIT DATE: 3/11/2025        eMERGENCY dEPARTMENT eNCOUnter    CHIEF COMPLAINT    Chief Complaint   Patient presents with    Back Pain     Patient reports lifting something heavy at work which resulted in lower back pain. Patient states the pain is mostly resolved but his work wants him checked out. Denies other acute symptoms.        HPI    Hermilo Catherine is a 44 y.o. male with a past medical history of  has no past medical history on file.     he presents to the ED for medical clearance.  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  States he must have lifted a box that was too heavy today containing construction materials and suddenly felt a sharp pain in his back on the right lower side.  He got off of work at 5:00 p.m. went home and took ibuprofen 800 mg.  He states that his work was making him come in to get evaluated and cleared as he thinks they are concerned he may Katie them.  He does not plan on going through worker comp and just wants to return to work.  Adamantly denies any symptoms including pain, numbness, weakness, tingling, issues with bowel or bladder.    Teletriage: 44 year old male presents to the ER with complaints of new onset right lower back pain that began today while lifting heavy object while at work today. Reports no paresthesias, weakness or bowel or bladder incontinence. Pain has overall improved since initial injury.     HPI       PAST MEDICAL HISTORY  History reviewed. No pertinent past medical history.    SURGICAL HISTORY    History reviewed. No pertinent surgical history.    FAMILY HISTORY    No family history on file.    SOCIAL HISTORY    Social History     Socioeconomic History    Marital status: Single   Tobacco Use    Smoking status: Every Day     Current packs/day: 0.00     Types: Cigarettes     Last attempt to quit: 3/2/2018     Years since quittin.0    Smokeless tobacco: Never   Substance and Sexual Activity     "Alcohol use: No    Drug use: No       MEDICATIONS  Current Outpatient Medications   Medication Instructions    (Magic mouthwash) 1:1:1 Benadryl 12.5mg/5ml liq, aluminum & magnesium hydroxide-simehticone (Maalox), lidocaine viscous 2% 10 mLs, Swish & Spit, Every 4 hours PRN, Gargle and spit for sore throat    azelastine (ASTELIN) 137 mcg, Nasal, 2 times daily PRN    ibuprofen (ADVIL,MOTRIN) 600 mg, Oral, Every 6 hours PRN    triamcinolone acetonide 0.1% (KENALOG) 0.1 % cream Topical (Top), 2 times daily       ALLERGIES    Review of patient's allergies indicates:  No Known Allergies      REVIEW OF SYSTEMS   Review of Systems       PHYSICAL EXAM    Reviewed Triage Note    VITAL SIGNS:   ED Triage Vitals [03/11/25 2028]   Encounter Vitals Group      BP (!) 142/90      Systolic BP Percentile       Diastolic BP Percentile       Pulse 97      Resp 18      Temp 98.3 °F (36.8 °C)      Temp Source Oral      SpO2 99 %      Weight 206 lb      Height 5' 11"      Head Circumference       Peak Flow       Pain Score       Pain Loc       Pain Education       Exclude from Growth Chart        Patient Vitals for the past 24 hrs:   BP Temp Temp src Pulse Resp SpO2 Height Weight   03/11/25 2028 (!) 142/90 98.3 °F (36.8 °C) Oral 97 18 99 % 5' 11" (1.803 m) 93.4 kg (206 lb)       Physical Exam    Nursing note and vitals reviewed.  Constitutional: He appears well-developed and well-nourished.   HENT:   Head: Normocephalic and atraumatic.   Eyes: EOM are normal. Pupils are equal, round, and reactive to light. Scleral icterus is present.   Neck: Neck supple.   Normal range of motion.  Cardiovascular:  Normal rate and regular rhythm.           Pulmonary/Chest: Breath sounds normal. No respiratory distress.   Abdominal: Abdomen is soft. There is no abdominal tenderness.   Musculoskeletal:         General: Normal range of motion.      Cervical back: Normal range of motion and neck supple.      Comments: Negative straight leg test bilaterally.  " No midline tenderness of the cervical thoracic or lumbar spine.  No reproducible tenderness to the muscles throughout the back or obvious spasm.  Ambulates with strong steady gait.     Neurological: He is alert and oriented to person, place, and time.   Skin: Skin is warm and dry.   Psychiatric: He has a normal mood and affect.            EKG     Interpreted by EM physician if performed:               LABS  Pertinent labs reviewed. (See chart for details)   Labs Reviewed - No data to display      RADIOLOGY          Imaging Results    None           PROCEDURES    Procedures      ED COURSE & MEDICAL DECISION MAKING    Pertinent Labs & Imaging studies reviewed. (See chart for details and specific orders.)          Summary of review of records:       Medical Decision Making  Risk  Prescription drug management.          This is an emergent evaluation of a 44 y.o. male who presents for evaluation of back pain occurring at work today which has since resolved. After my evaluation, I believe that the patient's symptoms are likely due to a muscle strain.      There are no signs of saddle anesthesia, incontinence or retention, neurologic deficits, fevers, trauma or midline tenderness on history or physical to suggest cauda equina, infectious process, fracture or subluxation. No history of IVDA in the past 6 months. Not immunocompromised. Considered and doubt discitis, osteomyelitis, spinal epidural abscess, spinal hematoma, cauda equina syndrome. Clinical impression and rx discussed with patient. Follow up to be arranged by pt with PCP or referred physician within 1 week. Pt has been advised to return to the ED immediately for any worsening symptoms, or for any other immediate concerns.. Pt expressed understanding.           Medications - No data to display               FINAL IMPRESSION    Final diagnoses:  [S39.012A] Strain of lumbar region, initial encounter (Primary)       DISPOSITION  Patient discharged in stable  condition        ED Prescriptions       Medication Sig Dispense Start Date End Date Auth. Provider    ibuprofen (ADVIL,MOTRIN) 600 MG tablet Take 1 tablet (600 mg total) by mouth every 6 (six) hours as needed for Pain. 20 tablet 3/11/2025 -- Nora Youssef,           Follow-up Information       Follow up With Specialties Details Why Contact Info    Beny Diaz MD Family Medicine Schedule an appointment as soon as possible for a visit  As needed, If symptoms worsen [address] [facility] [address]  [phone]      [facility] Emergency Dept Emergency Medicine  As needed, If symptoms worsen [address] [facility] [address] [phone]              DISCLAIMER: This note was prepared with [employer] voice recognition transcription software. Garbled syntax, mangled pronouns, and other bizarre constructions may be attributed to that software system.             Nora Youssef DO  03/11/25 2699

## 2025-05-12 ENCOUNTER — HOSPITAL ENCOUNTER (EMERGENCY)
Facility: HOSPITAL | Age: 45
Discharge: HOME | End: 2025-05-13
Attending: EMERGENCY MEDICINE
Payer: COMMERCIAL

## 2025-05-12 VITALS
SYSTOLIC BLOOD PRESSURE: 127 MMHG | RESPIRATION RATE: 18 BRPM | HEIGHT: 71 IN | OXYGEN SATURATION: 98 % | TEMPERATURE: 98 F | HEART RATE: 90 BPM | WEIGHT: 225 LBS | BODY MASS INDEX: 31.5 KG/M2 | DIASTOLIC BLOOD PRESSURE: 77 MMHG

## 2025-05-12 DIAGNOSIS — S40.011A CONTUSION OF RIGHT SHOULDER, INITIAL ENCOUNTER: Primary | ICD-10-CM

## 2025-05-12 DIAGNOSIS — M25.511 SHOULDER PAIN, RIGHT: ICD-10-CM

## 2025-05-12 PROCEDURE — 63600175 PHARM REV CODE 636 W HCPCS: Mod: JZ,TB

## 2025-05-12 PROCEDURE — 96372 THER/PROPH/DIAG INJ SC/IM: CPT

## 2025-05-12 PROCEDURE — 99284 EMERGENCY DEPT VISIT MOD MDM: CPT | Mod: 25

## 2025-05-12 RX ORDER — KETOROLAC TROMETHAMINE 30 MG/ML
30 INJECTION, SOLUTION INTRAMUSCULAR; INTRAVENOUS
Status: COMPLETED | OUTPATIENT
Start: 2025-05-12 | End: 2025-05-12

## 2025-05-12 RX ADMIN — KETOROLAC TROMETHAMINE 30 MG: 30 INJECTION, SOLUTION INTRAMUSCULAR; INTRAVENOUS at 10:05

## 2025-05-12 NOTE — Clinical Note
"Hermilo Mariealessia Catherine was seen and treated in our emergency department on 5/12/2025.  He may return to work on 05/14/2025.       If you have any questions or concerns, please don't hesitate to call.      Buddy Jackson MD"

## 2025-05-13 RX ORDER — NAPROXEN 500 MG/1
500 TABLET ORAL 2 TIMES DAILY WITH MEALS
Qty: 14 TABLET | Refills: 0 | Status: SHIPPED | OUTPATIENT
Start: 2025-05-13 | End: 2025-05-20

## 2025-05-13 RX ORDER — METHOCARBAMOL 500 MG/1
500 TABLET, FILM COATED ORAL 3 TIMES DAILY
Qty: 15 TABLET | Refills: 0 | Status: SHIPPED | OUTPATIENT
Start: 2025-05-13 | End: 2025-05-18

## 2025-05-13 NOTE — ED PROVIDER NOTES
Encounter Date: 5/12/2025       History     Chief Complaint   Patient presents with    Shoulder Injury     Patient states he was standing on a chair yesterday when it gave way and he fell on his right shoulder. He is unable to lift it without severe pain. Denies other trauma, loss of consciousness.      Patient is a 44-year-old male with no significant past medical history who presents to emergency room for right shoulder pain.  Patient states he was standing on a chair yesterday when he fell backwards and attempted to catch himself, but landed on his right shoulder.  Since then, he has been having pain to the area that is worse with abduction.  No head injury loss of consciousness.  No other areas of pain.  No weakness, numbness, or tingling.  Prior medication includes Motrin.  Last dose this morning.    The history is provided by the patient. No  was used.     Review of patient's allergies indicates:  No Known Allergies  History reviewed. No pertinent past medical history.  History reviewed. No pertinent surgical history.  No family history on file.  Social History[1]  Review of Systems   Constitutional:  Negative for chills, diaphoresis, fatigue and fever.   Musculoskeletal:  Positive for arthralgias (right shoulder). Negative for joint swelling and myalgias.   Skin:  Negative for color change, rash and wound.   Neurological:  Negative for weakness and numbness.       Physical Exam     Initial Vitals [05/12/25 2028]   BP Pulse Resp Temp SpO2   127/77 90 18 98.1 °F (36.7 °C) 98 %      MAP       --         Physical Exam    Nursing note and vitals reviewed.  Constitutional: He appears well-developed and well-nourished. He is not diaphoretic. No distress.   HENT:   Head: Normocephalic and atraumatic.   Right Ear: External ear normal.   Left Ear: External ear normal.   Eyes: Conjunctivae and EOM are normal.   Neck: Neck supple.   Normal range of motion.  Pulmonary/Chest: No respiratory distress.    Musculoskeletal:         General: No edema.      Right shoulder: Tenderness present. No swelling or deformity. Decreased range of motion.        Arms:       Cervical back: Normal range of motion and neck supple.      Comments: Patient with limited range of motion of right shoulder due to pain.  No obvious swelling or deformity.  Radial pulses 2+.  Patient with full range of motion of right elbow and wrist.   strength 5/5.  Sensation intact throughout.     Neurological: He is alert and oriented to person, place, and time. He has normal strength.   Skin: Skin is warm. Capillary refill takes less than 2 seconds.   Psychiatric: He has a normal mood and affect. His behavior is normal. Thought content normal.         ED Course   Procedures  Labs Reviewed - No data to display       Imaging Results              X-Ray Shoulder Complete 2 View Right (Final result)  Result time 05/13/25 01:50:21      Final result by Jorje Fink MD (05/13/25 01:50:21)                   Impression:      No acute radiographic abnormality.      Electronically signed by: Jorje Fink  Date:    05/13/2025  Time:    01:50               Narrative:    EXAMINATION:  XR SHOULDER COMPLETE 2 OR MORE VIEWS RIGHT    CLINICAL HISTORY:  Pain in right shoulder    TECHNIQUE:  Two or three views of the right shoulder were performed.    COMPARISON:  Right shoulder radiographs and bilateral AC joint radiographs, all obtained on 04/02/2018    FINDINGS:  No acute osseous, articular, or soft tissue abnormality is demonstrated radiographically.    Mild degenerative changes in the right AC joint.    Note that image saturation limits assessment some of the soft tissues.                                       Medications   ketorolac injection 30 mg (30 mg Intramuscular Given 5/12/25 5862)     Medical Decision Making  Patient presents to emergency room for right shoulder pain and decreased range of motion.  Vital signs stable and within normal limits.  Physical  exam as stated above.    Differential Diagnosis includes, but is not limited to fracture, dislocation, nerve injury/palsy, vascular injury, DVT, septic joint, cellulitis, bursitis, muscle strain, ligament tear/sprain, laceration, foreign body, abrasion, soft tissue contusion, osteoarthritis, or gout.  I do not suspect nerve or vascular injury, as sensation and pulses intact.  No significant extremity edema that would suggest DVT.  No overlying skin changes such as erythema or warmth.  Low suspicion for septic joint.  No laceration or abrasion noted on physical exam.  Pending x-ray reading at this time.    Patient handed over the supervising physician at shift change.  Pending radiology reading of shoulder x-ray.  Anticipate discharge with orthopedic follow up.  Patient is stable at this time.    Problems Addressed:  Contusion of right shoulder, initial encounter: acute illness or injury  Shoulder pain, right: acute illness or injury    Amount and/or Complexity of Data Reviewed  External Data Reviewed: notes.     Details: Patient last seen in the emergency room in 03/2025 due to lumbar strain.  Radiology: ordered. Decision-making details documented in ED Course.    Risk  Prescription drug management.  Risk Details: Comorbidities taken into consideration during the patient's evaluation and treatment include none.    Social determinants of health taken into consideration during development of our treatment plan include difficulty in obtaining follow-up, obtaining medications, health literacy, access to healthy options for preventative/conservative management, and/or support systems due to, but not limited to, transportation limitations, socioeconomic status, and environmental factors.                ED Course as of 05/13/25 1417   Mon May 12, 2025   2222 Pending x-ray. [BJ]   Tue May 13, 2025   0037 Patient care transferred to Dr. Jackson at shift change. I discussed the relevant history, physical exam, laboratory  findings, radiological findings and anticipated disposition plan. On coming staff to formally complete visit disposition, review any pending laboratory/radiological results and to addend treatment plan as necessary. [BJ]   0044 X-Ray Shoulder Complete 2 View Right  Shoulder x-ray independently interpreted: no fracture or dislocation.  Pending radiologist interpretation.    [SS]      ED Course User Index  [BJ] Shelley Tobin PA-C  [SS] Buddy Jackson MD                           Clinical Impression:  Final diagnoses:  [M25.511] Shoulder pain, right  [S40.011A] Contusion of right shoulder, initial encounter (Primary)          ED Disposition Condition    Discharge Stable          ED Prescriptions       Medication Sig Dispense Start Date End Date Auth. Provider    naproxen (NAPROSYN) 500 MG tablet Take 1 tablet (500 mg total) by mouth 2 (two) times daily with meals. for 7 days 14 tablet 5/13/2025 5/20/2025 Buddy Jackson MD    methocarbamoL (ROBAXIN) 500 MG Tab Take 1 tablet (500 mg total) by mouth 3 (three) times daily. for 5 days 15 tablet 5/13/2025 5/18/2025 Buddy Jackson MD          Follow-up Information       Follow up With Specialties Details Why Contact Info Additional Information    Beny Diaz MD Family Medicine Schedule an appointment as soon as possible for a visit   [address] [facility] [id number] [phone]       [facility] Orthopedics Schedule an appointment as soon as possible for a visit   [address]  [address]  [facility] [address] [phone] Please park in [address] or [address] and use [place] renae. Take Medical Office Bldg. elevators.            This note was partially created using salgomed Voice Recognition software. Typographical and content errors may occur with this process. While efforts are made to detect and correct such errors, in some cases errors will persist. For this reason, wording in this document should be considered in the proper context  and not strictly verbatim.          [1]   Social History  Tobacco Use    Smoking status: Every Day     Current packs/day: 0.00     Types: Cigarettes     Last attempt to quit: 3/2/2018     Years since quittin.2    Smokeless tobacco: Never   Substance Use Topics    Alcohol use: No    Drug use: Shelley Messer PA-C  25 1416

## 2025-05-13 NOTE — ED NOTES
Pt presents to the ED with c/o shoulder pain. Pt states that he was standing on a chair yesterday when he fell on his right shoulder. Pr denies hitting his head or LOC. Rates pain 7/10. Pt took a motrin 800 mg this am. Has not taken anything since. Denies signs of respiratory distress or bleeding. Pt sitting on the side of the bed.

## 2025-05-13 NOTE — DISCHARGE INSTRUCTIONS
Thank you for choosing [facility]!     Our goal in the Emergency Department is to always provide outstanding medical care. You may receive a survey by mail or e-mail in the next week regarding your experience today. We would greatly appreciate you completing and returning the survey. Your feedback provides us with a way to recognize our staff who provide very good care, and it helps us learn how to improve when your experience was below our aspiration of excellence.      It is important to remember that some problems are difficult to diagnose and may not be found during your first visit. Be sure to follow up with your primary care doctor and review any labs/imaging that was performed during your visit with them. If you do not have a primary care doctor, you may contact the one listed on your discharge paperwork, or you may also call the [facility] Appointment Desk at [phone] to schedule an appointment.     All medications may potentially have side effects and it is impossible to predict which medications may give you side effects. If you feel that you are having a negative effect of any medication you should immediately stop taking them and seek medical attention.  Do not drive or make any important decisions for 24 hours if you have received any pain medications, sedatives or mood altering drugs during your ER visit.    We appreciate you trusting us with your medical care. We will be happy to take care of you for all of your future medical needs. You may return to the ER at any time for any new/concerning symptoms, worsening condition, or failure to improve. We hope you feel better soon.     Sincerely,    Buddy Jackson Jr., MD  Board-Certified Emergency Medicine Physician  [facility]